# Patient Record
Sex: FEMALE | Race: OTHER
[De-identification: names, ages, dates, MRNs, and addresses within clinical notes are randomized per-mention and may not be internally consistent; named-entity substitution may affect disease eponyms.]

---

## 2017-03-19 ENCOUNTER — HOSPITAL ENCOUNTER (EMERGENCY)
Dept: HOSPITAL 62 - ER | Age: 42
LOS: 1 days | Discharge: HOME | End: 2017-03-20
Payer: SELF-PAY

## 2017-03-19 DIAGNOSIS — L03.115: Primary | ICD-10-CM

## 2017-03-19 DIAGNOSIS — F17.200: ICD-10-CM

## 2017-03-19 DIAGNOSIS — E11.628: ICD-10-CM

## 2017-03-19 PROCEDURE — 80053 COMPREHEN METABOLIC PANEL: CPT

## 2017-03-19 PROCEDURE — 82962 GLUCOSE BLOOD TEST: CPT

## 2017-03-19 PROCEDURE — 99283 EMERGENCY DEPT VISIT LOW MDM: CPT

## 2017-03-19 PROCEDURE — 96365 THER/PROPH/DIAG IV INF INIT: CPT

## 2017-03-19 PROCEDURE — 85025 COMPLETE CBC W/AUTO DIFF WBC: CPT

## 2017-03-19 PROCEDURE — 36415 COLL VENOUS BLD VENIPUNCTURE: CPT

## 2017-03-19 PROCEDURE — 96375 TX/PRO/DX INJ NEW DRUG ADDON: CPT

## 2017-03-19 PROCEDURE — 96376 TX/PRO/DX INJ SAME DRUG ADON: CPT

## 2017-03-20 VITALS — DIASTOLIC BLOOD PRESSURE: 74 MMHG | SYSTOLIC BLOOD PRESSURE: 130 MMHG

## 2017-03-20 LAB
ALBUMIN SERPL-MCNC: 4.4 G/DL (ref 3.5–5)
ALP SERPL-CCNC: 123 U/L (ref 38–126)
ALT SERPL-CCNC: 29 U/L (ref 9–52)
ANION GAP SERPL CALC-SCNC: 15 MMOL/L (ref 5–19)
AST SERPL-CCNC: 16 U/L (ref 14–36)
BASOPHILS NFR BLD MANUAL: 0 % (ref 0–2)
BILIRUB DIRECT SERPL-MCNC: 0 MG/DL (ref 0–0.3)
BILIRUB SERPL-MCNC: 0.6 MG/DL (ref 0.2–1.3)
BUN SERPL-MCNC: 14 MG/DL (ref 7–20)
CALCIUM: 10.6 MG/DL (ref 8.4–10.2)
CHLORIDE SERPL-SCNC: 101 MMOL/L (ref 98–107)
CO2 SERPL-SCNC: 24 MMOL/L (ref 22–30)
CREAT SERPL-MCNC: 0.53 MG/DL (ref 0.52–1.25)
EOSINOPHIL NFR BLD MANUAL: 0 % (ref 0–6)
ERYTHROCYTE [DISTWIDTH] IN BLOOD BY AUTOMATED COUNT: 12.7 % (ref 11.5–14)
GLUCOSE SERPL-MCNC: 362 MG/DL (ref 75–110)
HCT VFR BLD CALC: 51.5 % (ref 36–47)
HGB BLD-MCNC: 17.7 G/DL (ref 12–15.5)
HGB HCT DIFFERENCE: 1.6
MCH RBC QN AUTO: 29.6 PG (ref 27–33.4)
MCHC RBC AUTO-ENTMCNC: 34.3 G/DL (ref 32–36)
MCV RBC AUTO: 86 FL (ref 80–97)
POTASSIUM SERPL-SCNC: 4.2 MMOL/L (ref 3.6–5)
PROT SERPL-MCNC: 8.5 G/DL (ref 6.3–8.2)
RBC # BLD AUTO: 5.97 10^6/UL (ref 3.72–5.28)
RBC MORPH BLD: (no result)
SODIUM SERPL-SCNC: 139.9 MMOL/L (ref 137–145)
TOTAL CELLS COUNTED BLD: 100
VARIANT LYMPHS NFR BLD MANUAL: 27 % (ref 13–45)
WBC # BLD AUTO: 20.2 10^3/UL (ref 4–10.5)

## 2017-03-20 NOTE — ER DOCUMENT REPORT
ED Extremity Problem, Lower





- General


Chief Complaint: High Blood Sugar


Stated Complaint: RIGHT LEG PAIN


Time seen by provider: 02:14


Mode of Arrival: Ambulatory


Information source: Patient


TRAVEL OUTSIDE OF THE [place] IN LAST 30 DAYS: No





- HPI


Patient complains to provider of: Pain


Location: Leg


Occurred: Other - 4 days


Where: Home


Onset/Duration: Gradual


Quality of pain: Achy


Severity: Moderate


Pain Level: 3


Recent injury: No


Exacerbated by: Nothing


Relieved by: Nothing


Notes: 


Patient is a 41-year-old female who presents to the emergency room complaining 

of tender erythematous patch to the lateral portion of her right lower leg that'

s been worsening over the past 4 days, she denies any injury, no history of 

similar symptoms previously, no fever, patient reports that she is a diabetic, 

has not been on her metformin for greater than one year, has not seen a doctor 

in quite some time as well, she is a smoker





- Related Data


Allergies/Adverse Reactions: 


 





No Known Allergies Allergy (Verified 03/20/17 00:02)


 











Past Medical History





- General


Information source: Patient





- Social History


Smoking Status: Current Every Day Smoker


Chew tobacco use (# tins/day): No


Frequency of alcohol use: None


Drug Abuse: None


Family History: Reviewed & Not Pertinent


Endocrine Medical History: Reports: Hx Diabetes Mellitus Type 2


Renal/ Medical History: Denies: Hx Peritoneal Dialysis





- Immunizations


Hx Diphtheria, Pertussis, Tetanus Vaccination: Yes





Review of Systems





- Review of Systems


Constitutional: No symptoms reported


EENT: No symptoms reported


Cardiovascular: No symptoms reported


Respiratory: No symptoms reported


Gastrointestinal: No symptoms reported


Genitourinary: No symptoms reported


Female Genitourinary: No symptoms reported


Musculoskeletal: No symptoms reported


Skin: See HPI


Hematologic/Lymphatic: No symptoms reported


Neurological/Psychological: No symptoms reported


-: Yes All other systems reviewed and negative





Physical Exam





- Vital signs


Vitals: 


 











Temp Pulse Resp BP Pulse Ox


 


 98.9 F   94   16   169/100 H  97 


 


 03/19/17 23:48  03/19/17 23:48  03/19/17 23:48  03/19/17 23:48  03/19/17 23:48











Interpretation: Normal





- General


General appearance: Appears well, Alert





- HEENT


Head: Normocephalic, Atraumatic


Eyes: Normal


Pupils: PERRL





- Respiratory


Respiratory status: No respiratory distress


Chest status: Nontender


Breath sounds: Normal


Chest palpation: Normal





- Cardiovascular


Rhythm: Regular


Heart sounds: Normal auscultation


Murmur: No





- Abdominal


Inspection: Normal


Distension: No distension


Bowel sounds: Normal


Tenderness: Nontender


Organomegaly: No organomegaly





- Back


Back: Normal, Nontender





- Extremities


General upper extremity: Normal inspection, Nontender, Normal color, Normal ROM

, Normal temperature


General lower extremity: Normal ROM, Normal weight bearing.  No: Merary's sign


Calf: Nontender, Other - Patient has a 8 x 4 cm area of erythema with increased 

warmth and tenderness to the lateral portion of the right lower leg, distal 

sensation and motor is intact with 2+ DP pulses





- Neurological


Neuro grossly intact: Yes


Cognition: Normal


Orientation: AAOx4


Theodore Coma Scale Eye Opening: Spontaneous


Theodore Coma Scale Verbal: Oriented


[place] Coma Scale Motor: Obeys Commands


[place] Coma Scale Total: 15


Speech: Normal


Motor strength normal: LUE, RUE, LLE, RLE


Sensory: Normal





- Psychological


Associated symptoms: Normal affect, Normal mood





- Skin


Skin Temperature: Warm


Skin Moisture: Dry


Skin Color: Normal





Course





- Re-evaluation


Re-evalutation: 





03/20/17 03:48


Physical exam findings consistent with cellulitis, patient is noted to have 

markedly leukocytosis, as well as an elevated blood sugar consistent with her 

history of diabetes, states that she's been on metformin in the past, but has 

not been for at least a year, patient was provided with antibiotics, pain 

medication and a prescription for metformin, advised to follow-up with a 

primary care provider or return to the emergency room if symptoms worsen or 

fail to improve over the next 2-3 days, patient acknowledges understanding and 

agreement with this plan





- Vital Signs


Vital signs: 


 











Temp Pulse Resp BP Pulse Ox


 


 98.6 F   89   16   130/74 H  98 


 


 03/20/17 03:32  03/20/17 03:32  03/20/17 03:32  03/20/17 03:32  03/20/17 03:32














- Laboratory


Result Diagrams: 


 03/20/17 00:33





 03/20/17 00:33


Laboratory results interpreted by me: 


 











  03/20/17 03/20/17 03/20/17





  00:23 00:33 00:33


 


WBC   20.2 H 


 


RBC   5.97 H 


 


Hgb   17.7 H 


 


Hct   51.5 H 


 


Abs Neuts (Manual)   13.3 H 


 


Abs Lymphs (Manual)   5.7 H 


 


Glucose    362 H


 


POC Glucose  338 H  


 


Calcium    10.6 H


 


Total Protein    8.5 H














Discharge





- Discharge


Clinical Impression: 


 Cellulitis of right lower extremity





Diabetes


Qualifiers:


 Diabetes mellitus type: type 2 Diabetes mellitus complication status: with 

skin complications Diabetes mellitus complication detail: with other skin 

complication Diabetes mellitus long term insulin use: without long term use 

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 

complications





Condition: Stable


Disposition: HOME, SELF-CARE


Instructions:  Diabetes (OM), Control of Diabetes During Illness (OM), 

Cellulitis (OM)


Additional Instructions: 


Follow up with your primary care provider in one to 2 days.  Return to the 

emergency room immediately if symptoms worsen or any additional concerns.


Prescriptions: 


Clindamycin HCl [Cleocin 150 mg Capsule] 450 mg PO Q6 10 Days


Hydrocodone/Acetaminophen [Hydrocodon-Acetaminophen 5-325] 1 each PO Q6 #20 

tablet


Metformin HCl [Glucophage] 500 mg PO BID #60 tablet


Forms:  Return to Work

## 2017-03-21 ENCOUNTER — HOSPITAL ENCOUNTER (INPATIENT)
Dept: HOSPITAL 62 - ER | Age: 42
LOS: 3 days | Discharge: HOME | DRG: 603 | End: 2017-03-24
Attending: FAMILY MEDICINE | Admitting: FAMILY MEDICINE
Payer: SELF-PAY

## 2017-03-21 DIAGNOSIS — E11.65: ICD-10-CM

## 2017-03-21 DIAGNOSIS — K59.00: ICD-10-CM

## 2017-03-21 DIAGNOSIS — Z83.3: ICD-10-CM

## 2017-03-21 DIAGNOSIS — L03.115: Primary | ICD-10-CM

## 2017-03-21 DIAGNOSIS — F17.210: ICD-10-CM

## 2017-03-21 PROCEDURE — 84703 CHORIONIC GONADOTROPIN ASSAY: CPT

## 2017-03-21 PROCEDURE — 36415 COLL VENOUS BLD VENIPUNCTURE: CPT

## 2017-03-21 PROCEDURE — 87040 BLOOD CULTURE FOR BACTERIA: CPT

## 2017-03-21 PROCEDURE — 96376 TX/PRO/DX INJ SAME DRUG ADON: CPT

## 2017-03-21 PROCEDURE — 99284 EMERGENCY DEPT VISIT MOD MDM: CPT

## 2017-03-21 PROCEDURE — 80048 BASIC METABOLIC PNL TOTAL CA: CPT

## 2017-03-21 PROCEDURE — 82962 GLUCOSE BLOOD TEST: CPT

## 2017-03-21 PROCEDURE — 96367 TX/PROPH/DG ADDL SEQ IV INF: CPT

## 2017-03-21 PROCEDURE — 85025 COMPLETE CBC W/AUTO DIFF WBC: CPT

## 2017-03-21 PROCEDURE — 96375 TX/PRO/DX INJ NEW DRUG ADDON: CPT

## 2017-03-21 PROCEDURE — 96365 THER/PROPH/DIAG IV INF INIT: CPT

## 2017-03-22 LAB
ANION GAP SERPL CALC-SCNC: 16 MMOL/L (ref 5–19)
BASOPHILS # BLD AUTO: 0.1 10^3/UL (ref 0–0.2)
BASOPHILS NFR BLD AUTO: 0.4 % (ref 0–2)
BUN SERPL-MCNC: 14 MG/DL (ref 7–20)
CALCIUM: 9.6 MG/DL (ref 8.4–10.2)
CHLORIDE SERPL-SCNC: 102 MMOL/L (ref 98–107)
CO2 SERPL-SCNC: 23 MMOL/L (ref 22–30)
CREAT SERPL-MCNC: 0.51 MG/DL (ref 0.52–1.25)
EOSINOPHIL # BLD AUTO: 0.3 10^3/UL (ref 0–0.6)
EOSINOPHIL NFR BLD AUTO: 1.5 % (ref 0–6)
ERYTHROCYTE [DISTWIDTH] IN BLOOD BY AUTOMATED COUNT: 12.8 % (ref 11.5–14)
GLUCOSE SERPL-MCNC: 265 MG/DL (ref 75–110)
HCT VFR BLD CALC: 46.2 % (ref 36–47)
HGB BLD-MCNC: 15.8 G/DL (ref 12–15.5)
HGB HCT DIFFERENCE: 1.2
LYMPHOCYTES # BLD AUTO: 4.9 10^3/UL (ref 0.5–4.7)
LYMPHOCYTES NFR BLD AUTO: 26.3 % (ref 13–45)
MCH RBC QN AUTO: 29.5 PG (ref 27–33.4)
MCHC RBC AUTO-ENTMCNC: 34.2 G/DL (ref 32–36)
MCV RBC AUTO: 86 FL (ref 80–97)
MONOCYTES # BLD AUTO: 1.1 10^3/UL (ref 0.1–1.4)
MONOCYTES NFR BLD AUTO: 5.8 % (ref 3–13)
NEUTROPHILS # BLD AUTO: 12.2 10^3/UL (ref 1.7–8.2)
NEUTS SEG NFR BLD AUTO: 66 % (ref 42–78)
POTASSIUM SERPL-SCNC: 4.2 MMOL/L (ref 3.6–5)
RBC # BLD AUTO: 5.37 10^6/UL (ref 3.72–5.28)
SODIUM SERPL-SCNC: 141 MMOL/L (ref 137–145)
WBC # BLD AUTO: 18.5 10^3/UL (ref 4–10.5)

## 2017-03-22 RX ADMIN — METFORMIN HYDROCHLORIDE SCH MG: 500 TABLET, FILM COATED ORAL at 17:55

## 2017-03-22 RX ADMIN — Medication SCH ML: at 21:31

## 2017-03-22 RX ADMIN — INSULIN LISPRO PRN UNIT: 100 INJECTION, SOLUTION INTRAVENOUS; SUBCUTANEOUS at 17:55

## 2017-03-22 RX ADMIN — CLINDAMYCIN PHOSPHATE SCH ML: 18 INJECTION, SOLUTION INTRAVENOUS at 21:31

## 2017-03-22 RX ADMIN — KETOROLAC TROMETHAMINE SCH MG: 30 INJECTION, SOLUTION INTRAMUSCULAR at 21:31

## 2017-03-22 RX ADMIN — INSULIN LISPRO PRN UNIT: 100 INJECTION, SOLUTION INTRAVENOUS; SUBCUTANEOUS at 11:54

## 2017-03-22 RX ADMIN — NICOTINE PRN EACH: 14 PATCH, EXTENDED RELEASE TOPICAL at 10:15

## 2017-03-22 RX ADMIN — INSULIN LISPRO PRN UNIT: 100 INJECTION, SOLUTION INTRAVENOUS; SUBCUTANEOUS at 21:34

## 2017-03-22 RX ADMIN — INSULIN LISPRO PRN UNIT: 100 INJECTION, SOLUTION INTRAVENOUS; SUBCUTANEOUS at 09:40

## 2017-03-22 RX ADMIN — Medication SCH ML: at 14:59

## 2017-03-22 RX ADMIN — PROBIOTIC PRODUCT - TAB SCH MG: TAB at 17:55

## 2017-03-22 NOTE — PDOC H&P
History of Present Illness


Admission Date/PCP: 


  03/22/17 04:10





  


PCP  None


Patient complains of: rt leg infection


History of Present Illness: 


SEGUN CRAWFORD is a 41 year old  female, with underlying type II 

diabetes mellitus, recently started on metformin for same, and mild chronic 

constipation, who presents to the emergency room for the second time in 48-72 

hours for evaluation of above complaint.





4-5 days ago, she noted a small red bump on the lateral aspect of her right 

calf.  This has gradually increased in size with associated burning cramping 

discomfort, which worsens with ambulation or any contact with the area of 

involvement.  Was seen in the emergency room 2 days ago and placed on 

clindamycin; also started on metformin at that time due to history of diabetes.





Despite taking the clindamycin as prescribed, however, the area of redness has 

increased, along with the pain.  Subjective low-grade fever at home.  No 

vomiting.





No prior such episodes.  Denies any trauma to the site.  No history of MRSA.





Patient has been discussed with emergency room physician who evaluated the 

patient. .


 


 


 


Laboratory results are listed in [facility] and are reviewed. 


 


 


Social history/personal habits: .  No children.  Works as a .

  Half-pack of cigarettes per day.  No alcohol or illicit drug use.


 


 


 


Allergies/adverse reactions  NKDA.


 


 


Home medications are reviewed by discussion with patient and are to be  

reconciled by  pharmacy tech in [employer]. Home medications initially 

autopopulated into [facility] may not accurately reflect patient's true 

medications, dosages, and/or frequencies. 


 





REVIEW OF SYSTEMS: 


 


Constitutional:  See history and present illness. 


 


Eyes:   No current vision complaints. 


 


ENT: No swallowing problems or complaints.   No hearing problems or complaints. 


 


Pulmonary: No current complaints. 


 


Cardiovascular: No current complaints, including chest pain.  


 


Gastrointestinal: No current complaints, including nausea or vomiting. 


 


Skin:  See history and present illness. 


 


Hematologic: No unusual easy bruising or bleeding.  


 


Neurologic: No current complaints, including numbness or tingling. 


 


Musculoskeletal: No current complaints, including painful joints.   


 


Psychiatric: No current complaints, including anxiety or depression.   


 


Endocrine: No current complaints, including polyuria. 


 


Genitourinary: No current complaints, including dysuria. 


 


 


PHYSICAL EXAMINATION:


 


5 feet 4 inches tall.  62.6 kg.  BMI 23.7 kg/m.  Temperature 100.0 earlier; 

skin feels normothermic at present.  Blood pressure 116/80.  Pulse 78 and 

regular.  97% saturation on room air.  Respirations are 12 and unlabored.





Thin otherwise well-developed  female, who appears approximately her 

stated age.  Pleasant awake alert and cooperative.  Appears not to feel very 

well, primarily due to pain in her right calf region.  Mildly anxious, but 

without agitation.





 is present at her side; patient approves.





Female emergency room nurse Mohit is present.


 


Skin is warm and dry.  No grossly obvious evidence of rash in areas of skin 

examined.  No subcutaneous nodules palpated.  Examination of the lateral aspect 

of her right calf reveals an area of inflammation, warmth, and tenderness 

involving the great majority of the lateral aspect of the calf.  No crepitus 

fluctuance or expressible discharge.  No specific skin entrance site.


 


ENT: Hearing grossly normal  to normal conversation.  Tongue midline on 

protrusion pink and slightly moist.  


 


Eyes: No scleral icterus.  Pupils equal and reactive to light at 4 mm.  Pink 

conjunctivae. 


 


Neck is supple and nontender to gentle active range of motion and palpation.  

Midline trachea.  No palpable thyroid nodule mass enlargement or tenderness. 


 


Lymphatic: No palpable cervical or clavicular nodes. 


 


Neck and lymphatic exams limited by patient body habitus. 


 


Psychiatric: Reasonable insight into acute and chronic medical issues.  

Oriented to time location and why here. 


 


Lungs: Auscultation reveals clear and equal breath sounds bilaterally.  No use 

of accessory respiratory muscles. 


 


Cardiovascular: Heart regular rate and rhythm, without gallop murmur or rub.  

No carotid or abdominal aortic bruits.  No ankle or pedal edema.   palpable 

dorsalis pedis pulses. 


 


Abdomen: soft,  , slightly distended nontender with positive bowel sounds.  

Unable to adequately evaluate abdomen for masses or organomegaly due to 

distention.


 


Extremities: Feet are warm and dry.  No left calf tenderness to compression.  

No grossly obvious visual evidence of left calf swelling; very mild soft tissue 

swelling in the area of inflammation of the right calf..  Gentle manipulation 

of left lower extremity fails to reveal any obvious evidence of injury or 

instability to knee hip or ankle.  Not attempted on the right due to her 

discomfort.


 


Neurologic: Moves upper extremities grossly normally.  Patellar reflexes 

absent.  Absent Babinski.  Light touch is intact at feet.  Dorsiflexion and 

plantarflexion of feet 5 / 5 and symmetric. 


 


 








Past Medical History


Cardiac Medical History: 


   Denies: Congestive Heart Failure, DVT, Myocardial Infarction, Hyperlipidema, 

Hypertension, Pulmonary Embolism


Pulmonary Medical History: 


   Denies: Asthma, Chronic Obstructive Pulmonary Disease (COPD), Sleep Apnea


EENT Medical History: 


   Denies: Eyes, Ears, Throat


Neurological Medical History: 


   Denies: Hemorrhagic CVA, Ischemic CVA, Seizures


Endocrine Medical History: Reports: Diabetes Mellitus Type 2


   Denies: Hyperthyroidism, Hypothyroidism


Renal/ Medical History: Reports: None


GI Medical History: Reports: Other - Mild chronic constipation


   Denies: Cirrhosis, Hepatitis, Peptic Ulcer Disease


Musculoskeltal Medical History: Reports: None


Skin Medical History: Reports: None


Psychiatric Medical History: Reports: Tobacco Dependency


   Denies: Alcohol Dependency, Depression, General Anxiety Disorder, Substance 

Abuse


Hematology: Reports: None


Infectious Medical History: 


   Denies: Clostridium Difficile, Hepatitis B, Hepatitis C, Methicillin-

Resistant Staph Aureus





Past Surgical History


Past Surgical History: Reports: Other - Excision of benign cervical polyp





Social History


Information Source: Patient, Emergency Med Personnel, [facility]


Lives with: Spouse/Significant other


Smoking Status: Current Every Day Smoker


Frequency of Alcohol Use: None


Drugs: None





- Advance Directive


Resuscitation Status: Full Code


Surrogate healthcare decision maker:: 


Mother





Family History


Family History: Reviewed & Not Pertinent, DM


Parental Family History Reviewed: Yes


Children Family History Reviewed: NA


Sibling(s) Family History Reviewed.: Yes





Medication/Allergy


Home Medications: 








RX: Metformin HCl [Glucophage] 500 mg PO BID #60 tablet 03/20/17 


RX: Clindamycin HCl 300 mg PO Q8H #21 capsule 03/24/17 


Oxycodone HCl/Acetaminophen [Percocet 5-325 mg Tablet] 1 tab PO Q6HP PRN #10 

tablet 03/28/17 


Rivaroxaban [Xarelto] 15 mg PO BID 21 Days 03/28/17 








Allergies/Adverse Reactions: 


 





No Known Allergies Allergy (Verified 03/28/17 13:59)


 











Physical Exam


Vital Signs: 


 











Temp Pulse Resp BP Pulse Ox


 


 100.0 F   98   17   130/86 H  97 


 


 03/21/17 21:59  03/21/17 21:59  03/22/17 07:01  03/22/17 07:01  03/22/17 07:01








 Intake & Output











 03/21/17 03/22/17 03/23/17





 00:59 00:59 00:59


 


Intake Total   1350


 


Balance   1350














Assessment & Plan





- Diagnosis


(1) Tobacco dependency


Is this a current diagnosis for this admission?: YesPlan: 


.  When necessary Nicotine patch.








(2) DVT prophylaxis


Is this a current diagnosis for this admission?: YesPlan: 


SCD, left lower extremity only.  Subcutaneous Lovenox.








(3) Cellulitis of right lower extremity


Is this a current diagnosis for this admission?: YesPlan: 


Intravenous vancomycin and Ancef.  Pharmacy to assist with vancomycin dosing.





Bed rest other than meals and bathroom.  Bed in 20 Trendelenburg.





I have strongly encouraged patient not to get out of bed without notifying 

staff , to avoid a fall with injury.


 


Impression and plans were discussed with patient, and , both of whom 

concur.


 


Time spent in evaluation and management of patient: 58 minutes.








(4) Diabetes mellitus type 2 in nonobese


Is this a current diagnosis for this admission?: YesPlan: 


Diabetic cardiac diet.  Accu-Cheks with appropriate sliding scale 

coverage.Resume home medications as appropriate once these have been determined 

and reviewed.  











- Inpatient Certification


Based on my medical assessment, after consideration of the patient's 

comorbidities, presenting symptoms, or acuity I expect that the services needed 

warrant INPATIENT care.: Yes


I certify that my determination is in accordance with my understanding of 

Medicare's requirements for reasonable and necessary INPATIENT services [42 CFR 

412.3e].: Yes


Medical Necessity: Failure to Improve With Outpatient Therapy, Need Close 

Monitoring Due to Risk of Patient Decompensation, Need for IV Antibiotics, Risk 

of Complication if Not Cared For in Hospital


Post Hospital Care: D/C or Transfer Summary

## 2017-03-22 NOTE — PDOC PROGRESS REPORT
Subjective


Progress Note for:: 03/22/17


Subjective:: 


The patient was seen earlier today on rounds.  The patient admits to ongoing 

calf pain.  The patient is tearful at times stating the pain is excruciating.  

The patient was noted to be texting during the interview.  The patient denies 

any nausea, vomiting, diarrhea, shortness of breath, dizziness, chest pain, 

heart palpitations, fevers, or chills.  The patient has remained afebrile.  

Blood pressures have been in a good range.  When prompted the patient voices no 

other concerns at this time.  





Review of systems: The rest of the review of systems is negative.





Physical Exam


Vital Signs: 


 











Temp Pulse Resp BP Pulse Ox


 


 98 F   75   20   169/94 H  100 


 


 03/22/17 14:20  03/22/17 14:20  03/22/17 14:20  03/22/17 14:20  03/22/17 14:20








 Intake & Output











 03/20/17 03/21/17 03/22/17





 23:59 23:59 23:59


 


Intake Total   50


 


Balance   50


 


Weight   64.5 kg











General appearance: PRESENT: no acute distress, well-developed, well-nourished


Head exam: PRESENT: atraumatic, normocephalic


Eye exam: PRESENT: conjunctiva pink, EOMI, PERRLA.  ABSENT: scleral icterus


Ear exam: PRESENT: normal external ear exam


Mouth exam: PRESENT: moist, tongue midline


Neck exam: ABSENT: carotid bruit, JVD, lymphadenopathy, thyromegaly


Respiratory exam: PRESENT: clear to auscultation patrica.  ABSENT: rales, rhonchi, 

wheezes


Cardiovascular exam: PRESENT: RRR.  ABSENT: diastolic murmur, rubs, systolic 

murmur


Pulses: PRESENT: normal dorsalis pedis pul


Vascular exam: PRESENT: normal capillary refill


GI/Abdominal exam: PRESENT: normal bowel sounds, soft.  ABSENT: distended, 

guarding, mass, organolmegaly, rebound, tenderness


Rectal exam: PRESENT: deferred


Extremities exam: PRESENT: calf tenderness - Redness and cellulitis appears to 

have receded from previous markings..  ABSENT: clubbing, pedal edema


Neurological exam: PRESENT: alert, awake, oriented to person, oriented to place

, oriented to time, oriented to situation, CN II-XII grossly intact.  ABSENT: 

motor sensory deficit


Psychiatric exam: PRESENT: appropriate affect, normal mood.  ABSENT: homicidal 

ideation, suicidal ideation


Skin exam: PRESENT: dry, intact, warm.  ABSENT: cyanosis, rash





Results


Laboratory Results: 


 Labs- Last Values











WBC  18.5 10^3/uL (4.0-10.5)  H  03/22/17  00:43    


 


RBC  5.37 10^6/uL (3.72-5.28)  H  03/22/17  00:43    


 


Hgb  15.8 g/dL (12.0-15.5)  H  03/22/17  00:43    


 


Hct  46.2 % (36.0-47.0)   03/22/17  00:43    


 


MCV  86 fl (80-97)   03/22/17  00:43    


 


MCH  29.5 pg (27.0-33.4)   03/22/17  00:43    


 


MCHC  34.2 g/dL (32.0-36.0)   03/22/17  00:43    


 


RDW  12.8 % (11.5-14.0)   03/22/17  00:43    


 


Plt Count  300 10^3/uL (150-450)   03/22/17  00:43    


 


Seg Neutrophils %  66.0 % (42-78)   03/22/17  00:43    


 


Lymphocytes %  26.3 % (13-45)   03/22/17  00:43    


 


Monocytes %  5.8 % (3-13)   03/22/17  00:43    


 


Eosinophils %  1.5 % (0-6)   03/22/17  00:43    


 


Basophils %  0.4 % (0-2)   03/22/17  00:43    


 


Absolute Neutrophils  12.2 10^3/uL (1.7-8.2)  H  03/22/17  00:43    


 


Absolute Lymphocytes  4.9 10^3/uL (0.5-4.7)  H  03/22/17  00:43    


 


Absolute Monocytes  1.1 10^3/uL (0.1-1.4)   03/22/17  00:43    


 


Absolute Eosinophils  0.3 10^3/uL (0.0-0.6)   03/22/17  00:43    


 


Absolute Basophils  0.1 10^3/uL (0.0-0.2)   03/22/17  00:43    


 


Sodium  141.0 mmol/L (137-145)   03/22/17  00:43    


 


Potassium  4.2 mmol/L (3.6-5.0)   03/22/17  00:43    


 


Chloride  102 mmol/L ()   03/22/17  00:43    


 


Carbon Dioxide  23 mmol/L (22-30)   03/22/17  00:43    


 


Anion Gap  16  (5-19)   03/22/17  00:43    


 


BUN  14 mg/dL (7-20)   03/22/17  00:43    


 


Creatinine  0.51 mg/dL (0.52-1.25)  L  03/22/17  00:43    


 


Est GFR ( Amer)  > 60  (>60)   03/22/17  00:43    


 


Est GFR (Non-Af Amer)  > 60  (>60)   03/22/17  00:43    


 


Glucose  265 mg/dL ()  H  03/22/17  00:43    


 


POC Glucose  180 mg/dL ()  H  03/22/17  16:33    


 


Calcium  9.6 mg/dL (8.4-10.2)   03/22/17  00:43    


 


Serum HCG, Qual  NEGATIVE  (NEGATIVE)   03/22/17  00:43    














Assessment & Plan





- Diagnosis


(1) Cellulitis of right lower extremity


Is this a current diagnosis for this admission?: YesPlan: 





Will transition antibiotic coverage to clindamycin will add probiotic and 

follow.  The patient's creatinine is in a normal range will scheduled Toradol 

for now to aid with inflammation.








(2) Diabetes mellitus type 2 in nonobese


Is this a current diagnosis for this admission?: YesPlan: 


My home meds as well as signs go coverage








(3) Tobacco dependency


Is this a current diagnosis for this admission?: YesPlan: 


Spent 3 minutes discussing smoking cessation education. The patient declines 

any pharmacological intervention at this time however will add a PRN nicotine 

patch.








(4) DVT prophylaxis


Is this a current diagnosis for this admission?: Yes








- Time


Time Spent with patient: 35 or more minutes


Medications reviewed and adjusted accordingly: Yes


Anticipated discharge: Home


Within: within 24 hours, within 48 hours


Disposition: 


The patient is a full code.  Pending patient's symptomatology and diagnostic 

findings will reevaluate in the a.m.

## 2017-03-22 NOTE — ER DOCUMENT REPORT
ED General





- General


Chief Complaint: Skin Problem


Stated Complaint: POSSIBLE CELLULITIS


Notes: 


Patient is a 41-year-old female presents with complaint of a virus in her right 

leg.  She was here 2 days ago and placed on clindamycin.  She was also placed 

on the metformin due to a history of diabetes.  Today the redness has spread 

and the pain has increased in her leg.  She has been taking medications as 

prescribed.  Low-grade fevers at home.  No vomiting.  Patient has never had a 

problem with cellulitis in the past.


TRAVEL OUTSIDE OF THE [place] IN LAST 30 DAYS: No





- Related Data


Allergies/Adverse Reactions: 


 





No Known Allergies Allergy (Verified 03/20/17 00:02)


 











Past Medical History





- Social History


Smoking Status: Current Every Day Smoker


Chew tobacco use (# tins/day): No


Frequency of alcohol use: None


Drug Abuse: None


Family History: Reviewed & Not Pertinent


Patient has suicidal ideation: No


Patient has homicidal ideation: No


Endocrine Medical History: Reports: Hx Diabetes Mellitus Type 2


Renal/ Medical History: Denies: Hx Peritoneal Dialysis





- Immunizations


Hx Diphtheria, Pertussis, Tetanus Vaccination: Yes





Review of Systems





- Review of Systems


Notes: 


My Normal Review Basic





REVIEW OF SYSTEMS:


CONSTITUTIONAL :  Denies fever,  chills, or sweats.  Denies recent illness.


EENT:   Denies eye, ear, throat, or mouth pain or symptoms.  Denies nasal or 

sinus congestion.


RESPIRATORY:  Denies cough, cold, or chest congestion.  Denies shortness of 

breath, difficulty breathing, or wheezing.


GASTROINTESTINAL:  Denies abdominal pain.  Denies nausea, vomiting, or 

diarrhea.  Denies constipation.  Last BM: 


GENITOURINARY:  Denies difficulty urinating, painful urination, burning, 

frequency, or blood in urine.


MUSCULOSKELETAL:  Denies neck or back pain or joint pain or swelling.


SKIN: Cellulitis right lower extremity.


NEUROLOGICAL:  Denies altered mental status or loss of consciousness.  Denies 

headache.  Denies weakness or paralysis or loss of use of either side.  Denies 

problems with gait or speech.  Denies sensory or motor loss.


ALL OTHER SYSTEMS REVIEWED AND NEGATIVE.





Physical Exam





- Vital signs


Vitals: 


 











Temp Pulse Resp BP Pulse Ox


 


 100.0 F   98   16   156/89 H  97 


 


 03/21/17 21:59  03/21/17 21:59  03/21/17 21:59  03/21/17 21:59  03/21/17 21:59














- Notes


Notes: 


General Appearance: Well nourished, alert, cooperative, no acute distress, 

moderate obvious discomfort.


Vitals: reviewed, See vital signs table.


Head: no swelling or tenderness to the head


Eyes: PERRL, EOMI, Conjuctiva clear


Mouth: No decreasd moisture


Lungs: No wheezing, No rales, No rhonci, No accessory muscle use, good air 

exchange bilaterally.


Heart: Normal rate, Regular rythm, No murmur, no rub


Extremities: strength 5/5 in all extremities, good pulses in all extremities, 

area cellulitis and redness over the lateral aspect of the right lower 

extremity.  Area has extended beyond the demarcated margins were marked 2 days 

ago.  There is no fluctuance.  Area is tender to palpation.


Skin: warm, dry, appropriate color, no rash


Neuro: speech clear, oriented x 3, normal affect, responds appropriately to 

questions.





Course





- Vital Signs


Vital signs: 


 











Temp Pulse Resp BP Pulse Ox


 


 100.0 F   98   22 H  133/81 H  99 


 


 03/21/17 21:59  03/21/17 21:59  03/22/17 02:01  03/22/17 02:01  03/22/17 02:01














- Laboratory


Result Diagrams: 


 03/22/17 00:43





 03/22/17 00:43


Laboratory results interpreted by me: 


 











  03/22/17 03/22/17





  00:43 00:43


 


WBC  18.5 H 


 


RBC  5.37 H 


 


Hgb  15.8 H 


 


Absolute Neutrophils  12.2 H 


 


Absolute Lymphocytes  4.9 H 


 


Creatinine   0.51 L


 


Glucose   265 H














- Transfer of Care


Notes: 





03/22/17 03:45


Due to patient's fever, leukocytosis, and worsening cellulitis despite 

outpatient therapy if felt appropriate to admit her for further treatment of 

her cellulitis.  I've given her Vancomycin and Rocephin.  I did speak with the 

hospitalist who agrees in that the patient.





Discharge





- Discharge


Clinical Impression: 


 Cellulitis of right lower extremity, Hyperglycemia





Condition: Stable


Disposition: ADMITTED AS OBSERVATION


Admitting Provider: Hospitalist


Unit Admitted: Telemetry

## 2017-03-23 LAB
BASOPHILS # BLD AUTO: 0.1 10^3/UL (ref 0–0.2)
BASOPHILS NFR BLD AUTO: 0.7 % (ref 0–2)
EOSINOPHIL # BLD AUTO: 0.2 10^3/UL (ref 0–0.6)
EOSINOPHIL NFR BLD AUTO: 1.2 % (ref 0–6)
ERYTHROCYTE [DISTWIDTH] IN BLOOD BY AUTOMATED COUNT: 12.5 % (ref 11.5–14)
HCT VFR BLD CALC: 39.9 % (ref 36–47)
HGB BLD-MCNC: 13.4 G/DL (ref 12–15.5)
HGB HCT DIFFERENCE: 0.3
LYMPHOCYTES # BLD AUTO: 4 10^3/UL (ref 0.5–4.7)
LYMPHOCYTES NFR BLD AUTO: 26.3 % (ref 13–45)
MCH RBC QN AUTO: 28.7 PG (ref 27–33.4)
MCHC RBC AUTO-ENTMCNC: 33.7 G/DL (ref 32–36)
MCV RBC AUTO: 85 FL (ref 80–97)
MONOCYTES # BLD AUTO: 0.9 10^3/UL (ref 0.1–1.4)
MONOCYTES NFR BLD AUTO: 6.2 % (ref 3–13)
NEUTROPHILS # BLD AUTO: 10 10^3/UL (ref 1.7–8.2)
NEUTS SEG NFR BLD AUTO: 65.6 % (ref 42–78)
RBC # BLD AUTO: 4.69 10^6/UL (ref 3.72–5.28)
WBC # BLD AUTO: 15.3 10^3/UL (ref 4–10.5)

## 2017-03-23 RX ADMIN — METFORMIN HYDROCHLORIDE SCH MG: 500 TABLET, FILM COATED ORAL at 09:52

## 2017-03-23 RX ADMIN — PROBIOTIC PRODUCT - TAB SCH MG: TAB at 17:51

## 2017-03-23 RX ADMIN — Medication SCH ML: at 13:58

## 2017-03-23 RX ADMIN — CLINDAMYCIN PHOSPHATE SCH ML: 18 INJECTION, SOLUTION INTRAVENOUS at 13:57

## 2017-03-23 RX ADMIN — KETOROLAC TROMETHAMINE SCH MG: 30 INJECTION, SOLUTION INTRAMUSCULAR at 08:54

## 2017-03-23 RX ADMIN — PROBIOTIC PRODUCT - TAB SCH MG: TAB at 09:52

## 2017-03-23 RX ADMIN — ENOXAPARIN SODIUM SCH MG: 40 INJECTION SUBCUTANEOUS at 08:00

## 2017-03-23 RX ADMIN — CLINDAMYCIN PHOSPHATE SCH ML: 18 INJECTION, SOLUTION INTRAVENOUS at 21:25

## 2017-03-23 RX ADMIN — KETOROLAC TROMETHAMINE SCH MG: 30 INJECTION, SOLUTION INTRAMUSCULAR at 14:27

## 2017-03-23 RX ADMIN — NICOTINE PRN EACH: 14 PATCH, EXTENDED RELEASE TOPICAL at 11:58

## 2017-03-23 RX ADMIN — KETOROLAC TROMETHAMINE SCH MG: 30 INJECTION, SOLUTION INTRAMUSCULAR at 21:25

## 2017-03-23 RX ADMIN — Medication SCH ML: at 06:38

## 2017-03-23 RX ADMIN — METFORMIN HYDROCHLORIDE SCH MG: 500 TABLET, FILM COATED ORAL at 17:51

## 2017-03-23 RX ADMIN — Medication SCH ML: at 21:26

## 2017-03-23 RX ADMIN — INSULIN LISPRO PRN UNIT: 100 INJECTION, SOLUTION INTRAVENOUS; SUBCUTANEOUS at 11:54

## 2017-03-23 RX ADMIN — KETOROLAC TROMETHAMINE SCH MG: 30 INJECTION, SOLUTION INTRAMUSCULAR at 03:58

## 2017-03-23 RX ADMIN — CLINDAMYCIN PHOSPHATE SCH ML: 18 INJECTION, SOLUTION INTRAVENOUS at 06:38

## 2017-03-23 NOTE — PDOC PROGRESS REPORT
Subjective


Progress Note for:: 03/23/17


Subjective:: 


The patient was seen earlier today on rounds.  The patient admits to ongoing 

calf pain.  The patient is tearful at times but it overall is much improved.   

The patient denies any nausea, vomiting, diarrhea, shortness of breath, 

dizziness, chest pain, heart palpitations, fevers, or chills.  The patient has 

remained afebrile.  Blood pressures have been in a good range.  When prompted 

the patient voices no other concerns at this time.  





Review of systems: The rest of the review of systems is negative.





Physical Exam


Vital Signs: 


 











Temp Pulse Resp BP Pulse Ox


 


 98.8 F   76   16   128/85 H  98 


 


 03/23/17 12:49  03/23/17 12:49  03/23/17 12:49  03/23/17 12:49  03/23/17 12:49








 Intake & Output











 03/21/17 03/22/17 03/23/17





 23:59 23:59 23:59


 


Intake Total  130 160


 


Balance  130 160


 


Weight  64.5 kg 








General appearance: PRESENT: no acute distress, well-developed, well-nourished


Head exam: PRESENT: atraumatic, normocephalic


Eye exam: PRESENT: conjunctiva pink, EOMI, PERRLA.  ABSENT: scleral icterus


Ear exam: PRESENT: normal external ear exam


Mouth exam: PRESENT: moist, tongue midline


Neck exam: ABSENT: carotid bruit, JVD, lymphadenopathy, thyromegaly


Respiratory exam: PRESENT: clear to auscultation patrica.  ABSENT: rales, rhonchi, 

wheezes


Cardiovascular exam: PRESENT: RRR.  ABSENT: diastolic murmur, rubs, systolic 

murmur


Pulses: PRESENT: normal dorsalis pedis pul


Vascular exam: PRESENT: normal capillary refill


GI/Abdominal exam: PRESENT: normal bowel sounds, soft.  ABSENT: distended, 

guarding, mass, organolmegaly, rebound, tenderness


Rectal exam: PRESENT: deferred


Extremities exam: PRESENT: calf tenderness - Redness and cellulitis appears to 

have receded from previous markings and from yesterday.  ABSENT: clubbing, 

pedal edema


Neurological exam: PRESENT: alert, awake, oriented to person, oriented to place

, oriented to time, oriented to situation, CN II-XII grossly intact.  ABSENT: 

motor sensory deficit


Psychiatric exam: PRESENT: appropriate affect, normal mood.  ABSENT: homicidal 

ideation, suicidal ideation


Skin exam: PRESENT: dry, intact, warm.  ABSENT: cyanosis, rash





Results


Laboratory Results: 


 





 03/23/17 06:07 





 











  03/23/17 03/23/17





  04:17 06:07


 


WBC  Cancelled  15.3 H


 


RBC  Cancelled  4.69


 


Hgb  Cancelled  13.4  D


 


Hct  Cancelled  39.9


 


MCV  Cancelled  85


 


MCH  Cancelled  28.7


 


MCHC  Cancelled  33.7


 


RDW  Cancelled  12.5


 


Plt Count  Cancelled  265


 


Seg Neutrophils %  Cancelled  65.6


 


Lymphocytes %  Cancelled  26.3


 


Monocytes %  Cancelled  6.2


 


Eosinophils %  Cancelled  1.2


 


Basophils %  Cancelled  0.7


 


Absolute Neutrophils  Cancelled  10.0 H


 


Absolute Lymphocytes  Cancelled  4.0


 


Absolute Monocytes  Cancelled  0.9


 


Absolute Eosinophils  Cancelled  0.2


 


Absolute Basophils  Cancelled  0.1














Assessment & Plan





- Diagnosis


(1) Cellulitis of right lower extremity


Is this a current diagnosis for this admission?: YesPlan: 








Will continue antibiotic coverage to clindamycin will add probiotic and follow.

  The patient's creatinine is in a normal range continue scheduled Toradol for 

now to aid with inflammation.








(2) Diabetes mellitus type 2 in nonobese


Is this a current diagnosis for this admission?: YesPlan: 





Home medications and sliding scale coverage








(3) Tobacco dependency


Is this a current diagnosis for this admission?: YesPlan: 





PRN nicotine patch.








(4) DVT prophylaxis


Is this a current diagnosis for this admission?: Yes








- Time


Time Spent with patient: 25-34 minutes


Medications reviewed and adjusted accordingly: Yes


Anticipated discharge: Home


Within: within 24 hours

## 2017-03-24 VITALS — DIASTOLIC BLOOD PRESSURE: 85 MMHG | SYSTOLIC BLOOD PRESSURE: 140 MMHG

## 2017-03-24 RX ADMIN — Medication SCH ML: at 05:43

## 2017-03-24 RX ADMIN — KETOROLAC TROMETHAMINE SCH MG: 30 INJECTION, SOLUTION INTRAMUSCULAR at 02:18

## 2017-03-24 RX ADMIN — KETOROLAC TROMETHAMINE SCH MG: 30 INJECTION, SOLUTION INTRAMUSCULAR at 08:07

## 2017-03-24 RX ADMIN — METFORMIN HYDROCHLORIDE SCH MG: 500 TABLET, FILM COATED ORAL at 09:34

## 2017-03-24 RX ADMIN — PROBIOTIC PRODUCT - TAB SCH MG: TAB at 09:34

## 2017-03-24 RX ADMIN — INSULIN LISPRO PRN UNIT: 100 INJECTION, SOLUTION INTRAVENOUS; SUBCUTANEOUS at 05:59

## 2017-03-24 RX ADMIN — ENOXAPARIN SODIUM SCH MG: 40 INJECTION SUBCUTANEOUS at 07:56

## 2017-03-24 RX ADMIN — INSULIN LISPRO PRN UNIT: 100 INJECTION, SOLUTION INTRAVENOUS; SUBCUTANEOUS at 13:34

## 2017-03-24 RX ADMIN — CLINDAMYCIN PHOSPHATE SCH ML: 18 INJECTION, SOLUTION INTRAVENOUS at 05:40

## 2017-03-24 RX ADMIN — Medication SCH ML: at 13:37

## 2017-03-24 RX ADMIN — CLINDAMYCIN PHOSPHATE SCH ML: 18 INJECTION, SOLUTION INTRAVENOUS at 13:37

## 2017-03-24 NOTE — PDOC DISCHARGE SUMMARY
General





- Admit/Disc Date/PCP


Admission Date/Primary Care Provider: 


  03/22/17 08:04





  


[facility]


Discharge Date: 03/24/17





- Discharge Diagnosis


(1) Cellulitis of right lower extremity


Is this a current diagnosis for this admission?: Yes





(2) Diabetes mellitus type 2 in nonobese


Is this a current diagnosis for this admission?: Yes





(3) Tobacco dependency


Is this a current diagnosis for this admission?: Yes





(4) DVT prophylaxis


Is this a current diagnosis for this admission?: Yes








- Additional Information


Resuscitation Status: Full Code


Discharge Diet: As Tolerated, Regular


Discharge Activity: Balance Activity w/Rest, Keep Legs Elevated, Slowly 

Increase Activity


Home Medications: 








Metformin HCl [Glucophage] 500 mg PO BID #60 tablet 03/20/17 


Clindamycin HCl 300 mg PO Q8H #21 capsule 03/24/17 











History of Present Illness


Patient complains of: Right leg


History of Present Illness: 


SEGNU CRAWFORD is a 41 year old female with underlying type II diabetes mellitus

, recently started on metformin for same, and mild chronic constipation, who 

presents to the emergency room for the second time in 48-72 hours for 

evaluation of above complaint.





4-5 days ago, she noted a small red bump on the lateral aspect of her right 

calf.  This has gradually increased in size with associated burning cramping 

discomfort, which worsens with ambulation or any contact with the area of 

involvement.  Was seen in the emergency room 2 days ago and placed on 

clindamycin; also started on metformin at that time due to history of diabetes.





Despite taking the clindamycin as prescribed, however, the area of redness has 

increased, along with the pain.  Subjective low-grade fever at home.  No 

vomiting.





No prior such episodes.  Denies any trauma to the site.  No history of MRSA.








Hospital Course


Hospital Course: 


The patient was admitted to continuous telemetry unit.  Blood cultures were 

obtained.  Patient was started on broad-spectrum IV antibiotic coverage.  Blood 

cultures revealed no growth.  The patient was started on scheduled Toradol for 

inflammation.  The patient had drastic improvement of symptoms.  No further 

fever, white count has improved, and clinical assessment has improved.  Patient 

is ready for discharge.  The patient has no known allergies.  The patient does 

have a follow-up appointment with [facility].





Physical Exam


Vital Signs: 


 











Temp Pulse Resp BP Pulse Ox


 


 98.3 F   65   16   140/85 H  97 


 


 03/24/17 13:17  03/24/17 13:17  03/24/17 13:17  03/24/17 13:17  03/24/17 13:17








 Intake & Output











 03/22/17 03/23/17 03/24/17





 23:59 23:59 23:59


 


Intake Total 130 360 400


 


Balance 130 360 400


 


Weight 64.5 kg  66.3 kg








General appearance: PRESENT: no acute distress, well-developed, well-nourished


Head exam: PRESENT: atraumatic, normocephalic


Eye exam: PRESENT: conjunctiva pink, EOMI, PERRLA.  ABSENT: scleral icterus


Ear exam: PRESENT: normal external ear exam


Mouth exam: PRESENT: moist, tongue midline


Neck exam: ABSENT: carotid bruit, JVD, lymphadenopathy, thyromegaly


Respiratory exam: PRESENT: clear to auscultation patrica.  ABSENT: rales, rhonchi, 

wheezes


Cardiovascular exam: PRESENT: RRR.  ABSENT: diastolic murmur, rubs, systolic 

murmur


Pulses: PRESENT: normal dorsalis pedis pul


Vascular exam: PRESENT: normal capillary refill


GI/Abdominal exam: PRESENT: normal bowel sounds, soft.  ABSENT: distended, 

guarding, mass, organolmegaly, rebound, tenderness


Rectal exam: PRESENT: deferred


Extremities exam: PRESENT: calf tenderness -nearly resolved.  Redness and 

cellulitis appears to have receded from previous markings and from yesterday.  

ABSENT: clubbing, pedal edema


Neurological exam: PRESENT: alert, awake, oriented to person, oriented to place

, oriented to time, oriented to situation, CN II-XII grossly intact.  ABSENT: 

motor sensory deficit


Psychiatric exam: PRESENT: appropriate affect, normal mood.  ABSENT: homicidal 

ideation, suicidal ideation


Skin exam: PRESENT: dry, intact, warm.  ABSENT: cyanosis, rash








Results


Laboratory Results: 


 Labs- Last Values











WBC  15.3 10^3/uL (4.0-10.5)  H  03/23/17  06:07    


 


RBC  4.69 10^6/uL (3.72-5.28)   03/23/17  06:07    


 


Hgb  13.4 g/dL (12.0-15.5)  D 03/23/17  06:07    


 


Hct  39.9 % (36.0-47.0)   03/23/17  06:07    


 


MCV  85 fl (80-97)   03/23/17  06:07    


 


MCH  28.7 pg (27.0-33.4)   03/23/17  06:07    


 


MCHC  33.7 g/dL (32.0-36.0)   03/23/17  06:07    


 


RDW  12.5 % (11.5-14.0)   03/23/17  06:07    


 


Plt Count  265 10^3/uL (150-450)   03/23/17  06:07    


 


Seg Neutrophils %  65.6 % (42-78)   03/23/17  06:07    


 


Lymphocytes %  26.3 % (13-45)   03/23/17  06:07    


 


Monocytes %  6.2 % (3-13)   03/23/17  06:07    


 


Eosinophils %  1.2 % (0-6)   03/23/17  06:07    


 


Basophils %  0.7 % (0-2)   03/23/17  06:07    


 


Absolute Neutrophils  10.0 10^3/uL (1.7-8.2)  H  03/23/17  06:07    


 


Absolute Lymphocytes  4.0 10^3/uL (0.5-4.7)   03/23/17  06:07    


 


Absolute Monocytes  0.9 10^3/uL (0.1-1.4)   03/23/17  06:07    


 


Absolute Eosinophils  0.2 10^3/uL (0.0-0.6)   03/23/17  06:07    


 


Absolute Basophils  0.1 10^3/uL (0.0-0.2)   03/23/17  06:07    


 


Platelet Estimate  Cancelled   03/23/17  04:17    


 


Sodium  141.0 mmol/L (137-145)   03/22/17  00:43    


 


Potassium  4.2 mmol/L (3.6-5.0)   03/22/17  00:43    


 


Chloride  102 mmol/L ()   03/22/17  00:43    


 


Carbon Dioxide  23 mmol/L (22-30)   03/22/17  00:43    


 


Anion Gap  16  (5-19)   03/22/17  00:43    


 


BUN  14 mg/dL (7-20)   03/22/17  00:43    


 


Creatinine  0.51 mg/dL (0.52-1.25)  L  03/22/17  00:43    


 


Est GFR ( Amer)  > 60  (>60)   03/22/17  00:43    


 


Est GFR (Non-Af Amer)  > 60  (>60)   03/22/17  00:43    


 


Glucose  265 mg/dL ()  H  03/22/17  00:43    


 


POC Glucose  190 mg/dL ()  H  03/24/17  11:30    


 


Calcium  9.6 mg/dL (8.4-10.2)   03/22/17  00:43    


 


Serum HCG, Qual  NEGATIVE  (NEGATIVE)   03/22/17  00:43    


 


Slides for Path Review  Cancelled   03/23/17  04:17    








 











 03/22/17 10:36 Blood Culture - Preliminary





 Blood      NO GROWTH AFTER 48 HOURS


 


 03/22/17 00:43 Blood Culture - Preliminary





 Blood      NO GROWTH AFTER 48 HOURS














Qualifiers


**PATEINT BEING DISCHARGED WITH ANY OF THE FOLLOWING DIAGNOSIS?: No





Plan


Time Spent: Less than 30 Minutes

## 2017-03-28 ENCOUNTER — HOSPITAL ENCOUNTER (OUTPATIENT)
Dept: HOSPITAL 62 - SP | Age: 42
End: 2017-03-28
Payer: SELF-PAY

## 2017-03-28 ENCOUNTER — HOSPITAL ENCOUNTER (EMERGENCY)
Dept: HOSPITAL 62 - ER | Age: 42
Discharge: HOME | End: 2017-03-28
Payer: SELF-PAY

## 2017-03-28 DIAGNOSIS — M79.89: ICD-10-CM

## 2017-03-28 DIAGNOSIS — I82.491: Primary | ICD-10-CM

## 2017-03-28 DIAGNOSIS — M79.89: Primary | ICD-10-CM

## 2017-03-28 DIAGNOSIS — M79.604: ICD-10-CM

## 2017-03-28 DIAGNOSIS — L03.115: ICD-10-CM

## 2017-03-28 DIAGNOSIS — I82.491: ICD-10-CM

## 2017-03-28 DIAGNOSIS — F17.200: ICD-10-CM

## 2017-03-28 LAB
ALBUMIN SERPL-MCNC: 4.4 G/DL (ref 3.5–5)
ALP SERPL-CCNC: 65 U/L (ref 38–126)
ALT SERPL-CCNC: 35 U/L (ref 9–52)
ANION GAP SERPL CALC-SCNC: 14 MMOL/L (ref 5–19)
APTT BLD: 26.3 SEC (ref 23.5–35.8)
AST SERPL-CCNC: 14 U/L (ref 14–36)
BASOPHILS # BLD AUTO: 0.1 10^3/UL (ref 0–0.2)
BASOPHILS NFR BLD AUTO: 0.9 % (ref 0–2)
BILIRUB DIRECT SERPL-MCNC: 0.1 MG/DL (ref 0–0.4)
BILIRUB SERPL-MCNC: 0.5 MG/DL (ref 0.2–1.3)
BUN SERPL-MCNC: 13 MG/DL (ref 7–20)
CALCIUM: 10.4 MG/DL (ref 8.4–10.2)
CHLORIDE SERPL-SCNC: 104 MMOL/L (ref 98–107)
CO2 SERPL-SCNC: 25 MMOL/L (ref 22–30)
CREAT SERPL-MCNC: 0.46 MG/DL (ref 0.52–1.25)
EOSINOPHIL # BLD AUTO: 0.2 10^3/UL (ref 0–0.6)
EOSINOPHIL NFR BLD AUTO: 1.7 % (ref 0–6)
ERYTHROCYTE [DISTWIDTH] IN BLOOD BY AUTOMATED COUNT: 13.1 % (ref 11.5–14)
GLUCOSE SERPL-MCNC: 115 MG/DL (ref 75–110)
HCT VFR BLD CALC: 45.3 % (ref 36–47)
HGB BLD-MCNC: 15.7 G/DL (ref 12–15.5)
HGB HCT DIFFERENCE: 1.8
LYMPHOCYTES # BLD AUTO: 5.6 10^3/UL (ref 0.5–4.7)
LYMPHOCYTES NFR BLD AUTO: 39.3 % (ref 13–45)
MCH RBC QN AUTO: 29.4 PG (ref 27–33.4)
MCHC RBC AUTO-ENTMCNC: 34.6 G/DL (ref 32–36)
MCV RBC AUTO: 85 FL (ref 80–97)
MONOCYTES # BLD AUTO: 0.9 10^3/UL (ref 0.1–1.4)
MONOCYTES NFR BLD AUTO: 6.4 % (ref 3–13)
NEUTROPHILS # BLD AUTO: 7.4 10^3/UL (ref 1.7–8.2)
NEUTS SEG NFR BLD AUTO: 51.7 % (ref 42–78)
POTASSIUM SERPL-SCNC: 4 MMOL/L (ref 3.6–5)
PROT SERPL-MCNC: 8 G/DL (ref 6.3–8.2)
PROTHROMBIN TIME: 12.8 SEC (ref 11.4–15.4)
RBC # BLD AUTO: 5.34 10^6/UL (ref 3.72–5.28)
SODIUM SERPL-SCNC: 142.9 MMOL/L (ref 137–145)
WBC # BLD AUTO: 14.3 10^3/UL (ref 4–10.5)

## 2017-03-28 PROCEDURE — 85610 PROTHROMBIN TIME: CPT

## 2017-03-28 PROCEDURE — 93971 EXTREMITY STUDY: CPT

## 2017-03-28 PROCEDURE — 80053 COMPREHEN METABOLIC PANEL: CPT

## 2017-03-28 PROCEDURE — 99283 EMERGENCY DEPT VISIT LOW MDM: CPT

## 2017-03-28 PROCEDURE — 84702 CHORIONIC GONADOTROPIN TEST: CPT

## 2017-03-28 PROCEDURE — 85730 THROMBOPLASTIN TIME PARTIAL: CPT

## 2017-03-28 PROCEDURE — 36415 COLL VENOUS BLD VENIPUNCTURE: CPT

## 2017-03-28 PROCEDURE — 85025 COMPLETE CBC W/AUTO DIFF WBC: CPT

## 2017-03-28 NOTE — XCELERA REPORT
[address] [address]

                             Tel: [phone]

                             Fax: [phone]



                     Lower Extremity Venous Evaluation

____________________________________________________________________________



Name: SEGUN CRAWFORD

MRN: [id number]                Age: 41 yrs

Gender: Female                 : 1975

Patient Status: Outpatient     Patient Location: 

Account #: [id number]

Study Date: 2017 12:53 PM

____________________________________________________________________________



Procedure: Color flow and duplex imaging of the veins of the right lower

extremity as well as the left Common Femoral vein.

Reason For Study: RLE SWELLING





Ordering Physician: MONIKA MATHIAS

Performed By: Sue Parra

____________________________________________________________________________



____________________________________________________________________________





Right Sided Venous Evaluation

Enlarged vessel with echogenic content in the Gastrocnemius vein.

Otherwise normal vessel filling wall to wall, compression and augmentation

as well as Colour flow down to the infrageniculate veins.



Left Sided Venous Evaluation

The left common femoral vein is fully compressible. Spontaneous and phasic

flow is present in the left common femoral vein.



Critical Findings

Discussed with Dr Mathias at the [facility] clinic.

____________________________________________________________________________





Interpretation Summary

Deep venous thrombosis in the Gastrocnemius vein. On the right.

The patient will be transferred to the  for management.

____________________________________________________________________________



____________________________________________________________________________



Electronically signed by:      Lennox Williams      on 2017 01:25 PM



CC: MONIKA MATHIAS

>

Williams, Lennox

## 2017-03-28 NOTE — ER DOCUMENT REPORT
ED Medical Screen (RME)





- General


Stated Complaint: LEG PAIN


Notes: 


Patient sent to the emergency room by primary care physician for DVT of right 

leg.  Had an outpatient scan today.  Patient denies chest pain or shortness of 

breath.  Denies recent long distance travel or prolonged sitting.  No previous 

history of DVT.  Patient was hospitalized for 2-3 days for IV antibiotics on 

the 22nd because they thought the area was a cellulitis.





I have greeted and performed a rapid initial assessment of this patient.  A 

comprehensive ED assessment and evaluation of the patient, analysis of test 

results and completion of the medical decision making process will be conducted 

by additional ED providers.


TRAVEL OUTSIDE OF THE [place] IN LAST 30 DAYS: No





- Related Data


Allergies/Adverse Reactions: 


 





No Known Allergies Allergy (Verified 03/28/17 13:59)


 











Past Medical History





- Past Medical History


Cardiac Medical History: 


   Denies: Hx Congestive Heart Failure, Hx DVT, Hx Heart Attack, Hx 

Hypercholesterolemia, Hx Hypertension, Hx Pulmonary Embolism


Pulmonary Medical History: 


   Denies: Hx Asthma, Hx COPD, Hx Sleep Apnea


Neurological Medical History: Denies: Hx Seizures


Endocrine Medical History: Reports: Hx Diabetes Mellitus Type 2.  Denies: Hx 

Hyperthyroidism, Hx Hypothyroidism


Renal/ Medical History: Denies: Hx Peritoneal Dialysis


GI Medical History: Denies: Hx Cirrhosis, Hx Hepatitis


Psychiatric Medical History: 


   Denies: Hx Depression


Infectious Medical History: Denies: Hx C-Diff, Hx Hepatitis, Hx MRSA


Past Surgical History: Reports: Other - Excision of benign cervical polyp





- Immunizations


Hx Diphtheria, Pertussis, Tetanus Vaccination: Yes





Physical Exam





- Vital signs


Vitals: 





 











Temp Pulse Resp BP Pulse Ox


 


 98.3 F   80   20   137/87 H  100 


 


 03/28/17 13:56  03/28/17 13:56  03/28/17 13:56  03/28/17 13:56  03/28/17 13:56














- Extremities


Notes: 


Firm and tender swollen area right lateral lower leg.





Course





- Vital Signs


Vital signs: 





 











Temp Pulse Resp BP Pulse Ox


 


 98.3 F   80   20   137/87 H  100 


 


 03/28/17 13:56  03/28/17 13:56  03/28/17 13:56  03/28/17 13:56  03/28/17 13:56

## 2017-03-28 NOTE — ER DOCUMENT REPORT
ED Extremity Problem, Lower





- General


Chief Complaint: Leg Pain


Stated Complaint: LEG PAIN


Mode of Arrival: Ambulatory


Information source: Patient


Notes: 


41-year-old well-appearing female presents to the emergency department referred 

by PCP for positive DVT on ultrasound.  Patient audrey has had persistent pain 

to her right lower leg/lateral calf area over the last 2 weeks.  Audrey was 

recently admitted and discharged for cellulitis to area and has been taking 

clindamycin as prescribed.  Reports initially had redness and warmth along with 

the swelling to area and states redness and warmth has resolved but pain and 

localized swelling have persisted.   had an outpatient ultrasound done 

today which was positive for DVT and was referred to the ED by her primary care 

provider.  Denies fever, chest pain, shortness of breath, abdominal pain, 

extremity paresthesias or discoloration.


TRAVEL OUTSIDE OF THE [place] IN LAST 30 DAYS: No





- HPI


Patient complains to provider of: Pain, Swelling


Location: Leg


Onset/Duration: Persistent


Quality of pain: Throbbing


Severity: Moderate


Pain Level: 4


Recent injury: No


Exacerbated by: Movement, Walking


Relieved by: Elevation, Rest





- Related Data


Allergies/Adverse Reactions: 


 





No Known Allergies Allergy (Verified 03/28/17 13:59)


 











Past Medical History





- General


Information source: Patient





- Social History


Smoking Status: Current Every Day Smoker


Chew tobacco use (# tins/day): No


Frequency of alcohol use: None


Drug Abuse: None


Lives with: Family


Family History: Reviewed & Not Pertinent, DM


Patient has suicidal ideation: No


Patient has homicidal ideation: No





- Past Medical History


Cardiac Medical History: 


   Denies: Hx Congestive Heart Failure, Hx DVT, Hx Heart Attack, Hx 

Hypercholesterolemia, Hx Hypertension, Hx Pulmonary Embolism


Pulmonary Medical History: 


   Denies: Hx Asthma, Hx COPD, Hx Sleep Apnea


Neurological Medical History: Denies: Hx Seizures


Endocrine Medical History: Reports: Hx Diabetes Mellitus Type 2.  Denies: Hx 

Hyperthyroidism, Hx Hypothyroidism


Renal/ Medical History: Denies: Hx Peritoneal Dialysis


GI Medical History: Denies: Hx Cirrhosis, Hx Hepatitis


Psychiatric Medical History: 


   Denies: Hx Depression


Infectious Medical History: Denies: Hx C-Diff, Hx Hepatitis, Hx MRSA


Surgical Hx: Negative


Past Surgical History: Reports: Other - Excision of benign cervical polyp





- Immunizations


Hx Diphtheria, Pertussis, Tetanus Vaccination: Yes





Review of Systems





- Review of Systems


Constitutional: No symptoms reported


EENT: No symptoms reported


Cardiovascular: No symptoms reported


Respiratory: No symptoms reported


Gastrointestinal: No symptoms reported


Genitourinary: No symptoms reported


Female Genitourinary: No symptoms reported


Musculoskeletal: See HPI


Skin: No symptoms reported


Hematologic/Lymphatic: No symptoms reported


Neurological/Psychological: No symptoms reported


-: Yes All other systems reviewed and negative





Physical Exam





- Vital signs


Vitals: 


 











Temp Pulse Resp BP Pulse Ox


 


 98.3 F   80   20   137/87 H  100 


 


 03/28/17 13:56  03/28/17 13:56  03/28/17 13:56  03/28/17 13:56  03/28/17 13:56














- General


General appearance: Appears well, Alert


In distress: None





- HEENT


Head: Normocephalic, Atraumatic


Eyes: Normal


Pupils: PERRL





- Respiratory


Respiratory status: No respiratory distress


Chest status: Nontender


Breath sounds: Normal - CTAB


Chest palpation: Normal





- Cardiovascular


Rhythm: Regular


Heart sounds: Normal auscultation


Murmur: No


Pulses: Normal: Radial, Posterior tibial, Dorsalis pedis


Normal capillary refill: Yes





- Abdominal


Inspection: Normal


Distension: No distension


Bowel sounds: Normal


Tenderness: Nontender


Organomegaly: No organomegaly





- Back


Back: Normal, Nontender





- Extremities


General upper extremity: Normal inspection, Nontender, Normal color, Normal ROM

, Normal strength, Normal temperature.  No: Tender, Edema


General lower extremity: Normal inspection, Nontender, Normal color, Normal ROM

, Normal strength, Normal temperature, Normal weight bearing, Merary's sign - 

Positive right leg.  No: Tender, Edema


Hip: Normal, Nontender


Thigh: Normal, Nontender


Knee: Normal, Nontender


Calf: Tender - Mild tenderness on palpation to right lateral mid calf area with 

slight localized swelling and induration.  No erythema or warmth.  Distal motor 

and neurovascular function intact with immediate capillary refill, palpable 

pedal pulses, and immediate sensation.


Ankle: Normal, Nontender


Foot: Normal, Nontender





- Neurological


Neuro grossly intact: Yes


Cognition: Normal


Orientation: AAOx4


Theodore Coma Scale Eye Opening: Spontaneous


Theodore Coma Scale Verbal: Oriented


[place] Coma Scale Motor: Obeys Commands


Theodore Coma Scale Total: 15


Speech: Normal


Motor strength normal: LUE, RUE, LLE, RLE


Sensory: Normal





- Skin


Skin Temperature: Warm


Skin Moisture: Dry


Skin Color: Normal





Course





- Re-evaluation


Re-evalutation: 





03/28/17 20:30


Patient hemodynamically stable, in no distress, afebrile. Reviewed outpatient 

ultrasound from earlier today which shows DVT to right gastrocnemius vein.  

Patient's motor and neurovascular function is intact. First dose Xarelto given 

in ED. Spoke with on-call discharge planner/ Manny Lozano who will 

follow-up with patient tomorrow morning to ensure/facilitate patient obtains 

the rest of her Xarelto prescription and has adequate follow-up with pcp. Pt 

appears stable for discharge and outpatient treatment at this time, discussed 

at length with patient home care, follow-up, and ED return precautions. Pt 

presentation, findings, and plan consulted with ED physician Dr. Owens who 

concurs with evaluation and treatment. 








- Vital Signs


Vital signs: 


 











Temp Pulse Resp BP Pulse Ox


 


 98.3 F   80   20   137/87 H  100 


 


 03/28/17 13:56  03/28/17 13:56  03/28/17 13:56  03/28/17 13:56  03/28/17 13:56














- Laboratory


Result Diagrams: 


 03/28/17 14:20





 03/28/17 14:20


Laboratory results interpreted by me: 


 











  03/28/17 03/28/17





  14:20 14:20


 


WBC  14.3 H 


 


RBC  5.34 H 


 


Hgb  15.7 H 


 


Absolute Lymphocytes  5.6 H 


 


Creatinine   0.46 L


 


Glucose   115 H


 


Calcium   10.4 H














- Diagnostic Test


Radiology reviewed: Image reviewed, Reports reviewed





Discharge





- Discharge


Clinical Impression: 


DVT (deep venous thrombosis)


Qualifiers:


 DVT location: lower extremity Affected thrombotic vein of extremity: other 

lower extremity vein Laterality: right Chronicity: acute Qualified Code(s): 

I82.491 - Acute embolism and thrombosis of other specified deep vein of right 

lower extremity





Condition: Stable


Disposition: HOME, SELF-CARE


Instructions:  DVT Outpatient Treatment ([facility]), Oral Narcotic Medication ([facility])


Additional Instructions: 


Take the Xarelto as directed: 15 mg tablet twice daily for the first 21 days, 

then 20 mg once daily for the remainder of treatment. 


You have been given your first dose in the Emergency Department today and a 

prescription provided for the first 21 days of treatment. 


Follow-up with your primary care provider in the next 1-2 days for re-

evaluation and arrangement of your long term treatment. 


You should received a call tomorrow morning from the discharge planner/social 

worker.


If you do not receive a call, call  [phone] to speak with the discharge 

planner. 


Return to the Emergency Department for any worsening symptoms or concerns. 


Prescriptions: 


Oxycodone HCl/Acetaminophen [Percocet 5-325 mg Tablet] 1 tab PO Q6HP PRN #10 

tablet


 PRN Reason: 


Rivaroxaban [Xarelto] 15 mg PO BID 21 Days


Forms:  Elevated Blood Pressure, Smoking Cessation Education, Return to Work

## 2017-03-29 VITALS — SYSTOLIC BLOOD PRESSURE: 137 MMHG | DIASTOLIC BLOOD PRESSURE: 79 MMHG

## 2017-06-05 ENCOUNTER — HOSPITAL ENCOUNTER (OUTPATIENT)
Dept: HOSPITAL 62 - WI | Age: 42
End: 2017-06-05
Payer: COMMERCIAL

## 2017-06-05 DIAGNOSIS — Z12.31: Primary | ICD-10-CM

## 2017-06-05 PROCEDURE — G0202 SCR MAMMO BI INCL CAD: HCPCS

## 2017-06-05 PROCEDURE — 77067 SCR MAMMO BI INCL CAD: CPT

## 2017-06-05 NOTE — WOMENS IMAGING REPORT
EXAM DESCRIPTION:  BILAT SCREENING MAMMO W/CAD



COMPLETED DATE/TIME:  6/5/2017 10:47 am



REASON FOR STUDY:  Z12.31, ROUTINE SCREENING MAMMO Z12.31  ENCNTR SCREEN MAMMOGRAM FOR MALIGNANT NEOP
LASM OF JAMES



COMPARISON:  Baseline study



TECHNIQUE:  Standard craniocaudal and mediolateral oblique views of each breast recorded using [employer]
l acquisition.



LIMITATIONS:  None.



FINDINGS:  Findings present which are benign by mammographic criteria.  No suspicious masses, calcifi
cations or architectural distortion.

Pertinent benign findings: Scattered benign-appearing calcifications bilaterally.

Read with the assistance of CAD.

.[facility] - R2 Cenova Version 1.3

.[facility] - R2 Cenova Version 1.3

.[facility] - R2 Cenova Version 2.4

[place] [place] 2.[place]

.[facility] - R2  Version 9.2

Benign mammographic findings may include one or more of the following:  Smooth masses, popcorn/rim/co
arse calcifications, asymmetries, post-procedure changes, and lesions with long-standing stability.



IMPRESSION:  BENIGN MAMMOGRAPHIC FINDINGS.  BIRADS 2



BREAST DENSITY:  c. The breasts are heterogeneously dense, which may obscure small masses.



BIRAD:  2 BENIGN FINDING(S)



RECOMMENDATION:  ROUTINE SCREENING



COMMENT:  The patient has been notified of the results by letter per SA requirements. Additional no
tification policies are in place for contacting patient with suspicious or incomplete findings.

Quality ID #225: The American College of Radiology recommends an annual screening mammogram for women
 aged 40 years or over. This facility utilizes a reminder system to ensure that all patients receive 
reminder letters, and/or direct phone calls for appointments. This includes reminders for routine scr
eening mammograms, diagnostic mammograms, or other Breast Imaging Interventions when appropriate.  Th
is patient will be placed in the appropriate reminder system.

The American College of Radiology (ACR) has developed recommendations for screening MRI of the breast
s in certain patient populations, to be used in conjunction with mammography.  Breast MRI surveillanc
e may be appropriate for women with more than 20% lifetime risk of developing breast cancer  as deter
mined by genetic testing, significant family history of the disease, or history of mantle radiation f
or Hodgkins Disease.  ACR Practice Guidelines 2008.



TECHNICAL DOCUMENTATION:  FINDING NUMBER: (1)

ASSESSMENT: (1)

JOB ID:  [id number]

 2011 [email or web address]- All Rights Reserved

## 2017-10-20 ENCOUNTER — HOSPITAL ENCOUNTER (EMERGENCY)
Dept: HOSPITAL 62 - ER | Age: 42
Discharge: HOME | End: 2017-10-20
Payer: SELF-PAY

## 2017-10-20 VITALS — SYSTOLIC BLOOD PRESSURE: 146 MMHG | DIASTOLIC BLOOD PRESSURE: 97 MMHG

## 2017-10-20 DIAGNOSIS — Z86.718: ICD-10-CM

## 2017-10-20 DIAGNOSIS — E11.42: Primary | ICD-10-CM

## 2017-10-20 DIAGNOSIS — F17.210: ICD-10-CM

## 2017-10-20 DIAGNOSIS — Z79.02: ICD-10-CM

## 2017-10-20 LAB
ALBUMIN SERPL-MCNC: 4.4 G/DL (ref 3.5–5)
ALP SERPL-CCNC: 79 U/L (ref 38–126)
ALT SERPL-CCNC: 36 U/L (ref 9–52)
ANION GAP SERPL CALC-SCNC: 14 MMOL/L (ref 5–19)
AST SERPL-CCNC: 18 U/L (ref 14–36)
BASOPHILS # BLD AUTO: 0.2 10^3/UL (ref 0–0.2)
BASOPHILS NFR BLD AUTO: 0.9 % (ref 0–2)
BILIRUB DIRECT SERPL-MCNC: 0.3 MG/DL (ref 0–0.4)
BILIRUB SERPL-MCNC: 0.5 MG/DL (ref 0.2–1.3)
BUN SERPL-MCNC: 11 MG/DL (ref 7–20)
CALCIUM: 10 MG/DL (ref 8.4–10.2)
CHLORIDE SERPL-SCNC: 101 MMOL/L (ref 98–107)
CO2 SERPL-SCNC: 24 MMOL/L (ref 22–30)
CREAT SERPL-MCNC: 0.6 MG/DL (ref 0.52–1.25)
EOSINOPHIL # BLD AUTO: 0.3 10^3/UL (ref 0–0.6)
EOSINOPHIL NFR BLD AUTO: 1.4 % (ref 0–6)
ERYTHROCYTE [DISTWIDTH] IN BLOOD BY AUTOMATED COUNT: 13 % (ref 11.5–14)
GLUCOSE SERPL-MCNC: 309 MG/DL (ref 75–110)
HCT VFR BLD CALC: 47.1 % (ref 36–47)
HGB BLD-MCNC: 16.9 G/DL (ref 12–15.5)
HGB HCT DIFFERENCE: 3.6
LYMPHOCYTES # BLD AUTO: 5.4 10^3/UL (ref 0.5–4.7)
LYMPHOCYTES NFR BLD AUTO: 29.2 % (ref 13–45)
MCH RBC QN AUTO: 30.9 PG (ref 27–33.4)
MCHC RBC AUTO-ENTMCNC: 35.8 G/DL (ref 32–36)
MCV RBC AUTO: 86 FL (ref 80–97)
MONOCYTES # BLD AUTO: 1.1 10^3/UL (ref 0.1–1.4)
MONOCYTES NFR BLD AUTO: 5.8 % (ref 3–13)
NEUTROPHILS # BLD AUTO: 11.6 10^3/UL (ref 1.7–8.2)
NEUTS SEG NFR BLD AUTO: 62.7 % (ref 42–78)
POTASSIUM SERPL-SCNC: 4.2 MMOL/L (ref 3.6–5)
PROT SERPL-MCNC: 7.4 G/DL (ref 6.3–8.2)
RBC # BLD AUTO: 5.45 10^6/UL (ref 3.72–5.28)
SODIUM SERPL-SCNC: 138.7 MMOL/L (ref 137–145)
WBC # BLD AUTO: 18.5 10^3/UL (ref 4–10.5)

## 2017-10-20 PROCEDURE — 85025 COMPLETE CBC W/AUTO DIFF WBC: CPT

## 2017-10-20 PROCEDURE — 36415 COLL VENOUS BLD VENIPUNCTURE: CPT

## 2017-10-20 PROCEDURE — 99284 EMERGENCY DEPT VISIT MOD MDM: CPT

## 2017-10-20 PROCEDURE — 80053 COMPREHEN METABOLIC PANEL: CPT

## 2017-10-20 PROCEDURE — 93970 EXTREMITY STUDY: CPT

## 2017-10-20 NOTE — ER DOCUMENT REPORT
ED General





- General


Chief Complaint: Leg Pain


Stated Complaint: LEG PAIN


Time Seen by Provider: 10/20/17 12:45


Mode of Arrival: Wheelchair


Information source: Patient


Notes: 





42-year-old diabetic female history of DVT presents with complaints of right 

leg pain.  Patient notes her blood sugars have been in the 300s and she has 

been working on this with her primary care physician but they do not have it 

under control.  Patient is on Xarelto notes that she was having leg pain and 

her primary care physician was concerned about a another DVT and was sent in 

for evaluation


TRAVEL OUTSIDE OF THE [place] IN LAST 30 DAYS: No





- HPI


Onset: Last week


Onset/Duration: Persistent


Quality of pain: Sharp


Severity: Mild


Pain Level: 1


Associated symptoms: Other


Exacerbated by: Movement, Walking


Relieved by: Denies


Similar symptoms previously: Yes


Recently seen / treated by doctor: Yes





- Related Data


Allergies/Adverse Reactions: 


 





No Known Allergies Allergy (Verified 03/28/17 13:59)


 











Past Medical History





- General


Information source: Patient





- Social History


Smoking Status: Current Every Day Smoker


Cigarette use (# per day): Yes


Chew tobacco use (# tins/day): No


Smoking Education Provided: No


Frequency of alcohol use: None


Drug Abuse: None


Family History: Reviewed & Not Pertinent, DM


Patient has suicidal ideation: No


Patient has homicidal ideation: No





- Past Medical History


Cardiac Medical History: 


   Denies: Hx Congestive Heart Failure, Hx DVT, Hx Heart Attack, Hx 

Hypercholesterolemia, Hx Hypertension, Hx Pulmonary Embolism


Pulmonary Medical History: 


   Denies: Hx Asthma, Hx COPD, Hx Sleep Apnea


Neurological Medical History: Denies: Hx Seizures


Endocrine Medical History: Reports: Hx Diabetes Mellitus Type 2.  Denies: Hx 

Hyperthyroidism, Hx Hypothyroidism


Renal/ Medical History: Denies: Hx Peritoneal Dialysis


GI Medical History: Denies: Hx Cirrhosis, Hx Hepatitis


Psychiatric Medical History: 


   Denies: Hx Depression


Infectious Medical History: Denies: Hx C-Diff, Hx Hepatitis, Hx MRSA


Past Surgical History: Reports: Other - Excision of benign cervical polyp





- Immunizations


Hx Diphtheria, Pertussis, Tetanus Vaccination: Yes





Review of Systems





- Review of Systems


Notes: 





REVIEW OF SYSTEMS:


CONSTITUTIONAL :  Denies fever,  chills, or sweats.  Denies recent illness.


EENT:   Denies eye, ear, throat, or mouth pain or symptoms.  Denies nasal or 

sinus congestion or discharge.  Denies throat, tongue, or mouth swelling or 

difficulty swallowing.


CARDIOVASCULAR:  Denies chest pain.  Denies palpitations or racing or irregular 

heart beat.  Denies ankle edema.


RESPIRATORY:  Denies cough, cold, or chest congestion.  Denies shortness of 

breath, difficulty breathing, or wheezing.


GASTROINTESTINAL:  Denies abdominal pain or distention.  Denies nausea, vomiting

, or diarrhea.  Denies blood in vomitus, stools, or per rectum.  Denies black, 

tarry stools.  Denies constipation. 


GENITOURINARY:  Denies difficulty urinating, painful urination, burning, 

frequency, blood in urine, or discharge.


FEMALE  GENITOURINARY:  Denies vaginal bleeding, heavy or abnormal periods, 

irregular periods.  Denies vaginal discharge or odor. 


MUSCULOSKELETAL: Admits to right leg pain


SKIN:   Denies rash, lesions or sores.


HEMATOLOGIC :   Denies easy bruising or bleeding.


LYMPHATIC:  Denies swollen, enlarged glands.


NEUROLOGICAL:  Denies confusion or altered mental status.  Denies passing out 

or loss of consciousness.  Denies dizziness or lightheadedness.  Denies 

headache.  Denies weakness or paralysis or loss of use of either side.  Denies 

problems with gait or speech.  Denies sensory loss, numbness, or tingling.  

Denies seizures.


PSYCHIATRIC:  Denies anxiety or stress.  Denies depression, suicidal ideation, 

or homicidal ideation.





ALL OTHER SYSTEMS REVIEWED AND NEGATIVE.











PHYSICAL EXAMINATION:





GENERAL: Well-appearing, well-nourished and in no acute distress.





HEAD: Atraumatic, normocephalic.





EYES: Pupils equal round and reactive to light, extraocular movements intact, 

conjunctiva are normal.





ENT: Nares patent, oropharynx clear without exudates.  Moist mucous membranes.





NECK: Normal range of motion, supple without lymphadenopathy





LUNGS: Breath sounds clear to auscultation bilaterally and equal.  No wheezes 

rales or rhonchi.





HEART: Regular rate and rhythm without murmurs





ABDOMEN: Soft, nontender, nondistended abdomen.  No guarding, no rebound.  No 

masses appreciated.





Female : deferred





Musculoskeletal: Normal range of motion, no pitting or edema.  No cyanosis.





NEUROLOGICAL: Cranial nerves grossly intact.  Normal speech, normal gait.  

Normal sensory, motor exams





PSYCH: Normal mood, normal affect.





SKIN: Warm, Dry, normal turgor, no rashes or lesions noted.

















Dictation was performed using Dragon voice recognition software





Physical Exam





- Vital signs


Vitals: 





 











Temp Pulse Resp BP Pulse Ox


 


 98.7 F   92   20   157/104 H  100 


 


 10/20/17 12:18  10/20/17 12:18  10/20/17 12:18  10/20/17 12:18  10/20/17 12:18














Course





- Re-evaluation


Re-evalutation: 





10/20/17 14:35


Doppler was performed no acute abnormality was noted.  Patient overall looks 

well is in no distress.  I believe her symptoms may be secondary to neuropathy 

given how elevated her glucose has been in the past.  Patient has been given 

very strict return precautions regarding this states she understands and will 

follow up with primary care physician








After performing a Medical Screening Examination, I estimate there is LOW risk 

for OPEN FRACTURE, COMPARTMENT SYNDROME, TENDON RUPTURE, ACUTE NEUROVASCULAR 

INJURY, or RETAINED FOREIGN BODY, thus I consider the discharge disposition 

reasonable. Also, there is no evidence or peritonitis, sepsis, or toxicity.  I 

have reevaluated this patient multiple times and no significant life 

threatening changes are noted. The patient and I have discussed the diagnosis 

and risks, and we agree with discharging home with close follow-up with the 

understanding that symptoms and presentations can change. We also discussed 

returning to the Emergency Department immediately if new or worsening symptoms 

occur. We have discussed the symptoms which are most concerning (e.g., changing 

or worsening pain, fever, numbness, weakness, cool or painful digits) that 

necessitate immediate return.





- Vital Signs


Vital signs: 





 











Temp Pulse Resp BP Pulse Ox


 


 98.7 F   92   20   157/104 H  100 


 


 10/20/17 12:18  10/20/17 12:18  10/20/17 12:18  10/20/17 12:18  10/20/17 12:18














- Laboratory


Result Diagrams: 


 10/20/17 12:45





 10/20/17 12:45


Laboratory results interpreted by me: 





 











  10/20/17 10/20/17





  12:45 12:45


 


WBC  18.5 H 


 


RBC  5.45 H 


 


Hgb  16.9 H 


 


Hct  47.1 H 


 


Absolute Neutrophils  11.6 H 


 


Absolute Lymphocytes  5.4 H 


 


Glucose   309 H














- Diagnostic Test


Radiology reviewed: Image reviewed, Reports reviewed





Discharge





- Discharge


Clinical Impression: 


 Neuralgia, Diabetes mellitus type 2 in nonobese





Condition: Stable


Disposition: HOME, SELF-CARE


Instructions:  Neuralgia ([facility])


Prescriptions: 


Gabapentin [Neurontin 300 mg Capsule] 300 mg PO Q12 #60 capsule


Referrals: 


EDWIN ALICIA MD [Primary Care Provider] - Follow up as needed

## 2017-10-20 NOTE — ER DOCUMENT REPORT
ED Medical Screen (RME)





- General


Chief Complaint: Leg clot


Stated Complaint: LEG PAIN


Time Seen by Provider: 10/20/17 12:45


Mode of Arrival: Wheelchair


Information source: Patient


TRAVEL OUTSIDE OF THE [place] IN LAST 30 DAYS: No





- HPI


Patient complains to provider of: bilateral leg pain


Onset: Other - pt. with 2 day h/o bilateral leg pain.  Has h/o DVT in R leg -- 

currently on blood thinners





- Related Data


Allergies/Adverse Reactions: 


 





No Known Allergies Allergy (Verified 03/28/17 13:59)


 











Past Medical History





- Social History


Chew tobacco use (# tins/day): No


Frequency of alcohol use: None


Drug Abuse: None





- Past Medical History


Cardiac Medical History: 


   Denies: Hx Congestive Heart Failure, Hx DVT, Hx Heart Attack, Hx 

Hypercholesterolemia, Hx Hypertension, Hx Pulmonary Embolism


Pulmonary Medical History: 


   Denies: Hx Asthma, Hx COPD, Hx Sleep Apnea


Neurological Medical History: Denies: Hx Seizures


Endocrine Medical History: Reports: Hx Diabetes Mellitus Type 2.  Denies: Hx 

Hyperthyroidism, Hx Hypothyroidism


Renal/ Medical History: Denies: Hx Peritoneal Dialysis


GI Medical History: Denies: Hx Cirrhosis, Hx Hepatitis


Psychiatric Medical History: 


   Denies: Hx Depression


Infectious Medical History: Denies: Hx C-Diff, Hx Hepatitis, Hx MRSA


Past Surgical History: Reports: Other - Excision of benign cervical polyp





- Immunizations


Hx Diphtheria, Pertussis, Tetanus Vaccination: Yes





Physical Exam





- Vital signs


Vitals: 





 











Temp Pulse Resp BP Pulse Ox


 


 98.7 F   92   20   157/104 H  100 


 


 10/20/17 12:18  10/20/17 12:18  10/20/17 12:18  10/20/17 12:18  10/20/17 12:18














Course





- Vital Signs


Vital signs: 





 











Temp Pulse Resp BP Pulse Ox


 


 98.7 F   92   20   157/104 H  100 


 


 10/20/17 12:18  10/20/17 12:18  10/20/17 12:18  10/20/17 12:18  10/20/17 12:18

## 2017-10-21 NOTE — XCELERA REPORT
[address] [address]

                             Tel: [phone]

                             Fax: [phone]



                     Lower Extremity Venous Evaluation

____________________________________________________________________________



Name: SEGUN CRAWFORD

MRN: [id number]                Age: 42 yrs

Gender: Female                 : 1975

Patient Status: Emergency      Patient Location: ER

Account #: [id number]

Study Date: 10/20/2017 01:24 PM

Accession #: [id number]

____________________________________________________________________________



Procedure: Color flow and duplex imaging bilaterally of the veins of the

lower extremities as well as the Common Femoral veins.

Reason For Study: bilateral leg pain





Ordering Physician: IRENE WATERS

Performed By: Faib Zaidi

____________________________________________________________________________



____________________________________________________________________________





Right Sided Venous Evaluation

Normal vessel filling wall to wall, compression and augmentation as well as

Colour flow down to the infrageniculate veins.



Left Sided Venous Evaluation

Normal vessel filling wall to wall, compression and augmentation as well as

Colour flow down to the infrageniculate veins.

____________________________________________________________________________





Interpretation Summary

No duplex evidence of DVT or obstruction in the bilateral lower

extremities.

____________________________________________________________________________



____________________________________________________________________________



Electronically signed by:      Lennox Williams      on 10/21/2017 08:06 AM



CC: IRENE WATERS

>

Williams, Lennox

## 2017-12-19 ENCOUNTER — HOSPITAL ENCOUNTER (EMERGENCY)
Dept: HOSPITAL 62 - ER | Age: 42
Discharge: HOME | End: 2017-12-19
Payer: SELF-PAY

## 2017-12-19 VITALS — DIASTOLIC BLOOD PRESSURE: 95 MMHG | SYSTOLIC BLOOD PRESSURE: 155 MMHG
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
## 2017-12-19 DIAGNOSIS — F17.200: ICD-10-CM

## 2017-12-19 DIAGNOSIS — S92.525A: Primary | ICD-10-CM

## 2017-12-19 PROCEDURE — 99283 EMERGENCY DEPT VISIT LOW MDM: CPT

## 2017-12-19 NOTE — ER DOCUMENT REPORT
ED General





- General


Chief Complaint: Toe Injury


Stated Complaint: TOE INJURY


Time Seen by Provider: 12/19/17 02:53


Notes: 


Patient is a 42-year-old female who presents with complaints of hurting her 

left fifth toe by actually taking the bed.  She denies any other injuries or 

complaints.  She has some pain shooting into her foot on the lateral aspect of 

the foot.  She denies pain to the rest of the foot.  She denies pain to any 

other toes other than the left fifth toe.





TRAVEL OUTSIDE OF THE [place] IN LAST 30 DAYS: No





- Related Data


Allergies/Adverse Reactions: 


 





No Known Allergies Allergy (Verified 03/28/17 13:59)


 











Past Medical History





- Social History


Smoking Status: Current Every Day Smoker


Chew tobacco use (# tins/day): No


Frequency of alcohol use: None


Drug Abuse: None


Family History: Reviewed & Not Pertinent, DM


Patient has suicidal ideation: No


Patient has homicidal ideation: No





- Past Medical History


Cardiac Medical History: 


   Denies: Hx Congestive Heart Failure, Hx DVT, Hx Heart Attack, Hx 

Hypercholesterolemia, Hx Hypertension, Hx Pulmonary Embolism


Pulmonary Medical History: 


   Denies: Hx Asthma, Hx COPD, Hx Sleep Apnea


Neurological Medical History: Denies: Hx Seizures


Endocrine Medical History: Reports: Hx Diabetes Mellitus Type 2.  Denies: Hx 

Hyperthyroidism, Hx Hypothyroidism


Renal/ Medical History: Denies: Hx Peritoneal Dialysis


GI Medical History: Denies: Hx Cirrhosis, Hx Hepatitis


Psychiatric Medical History: 


   Denies: Hx Depression


Infectious Medical History: Denies: Hx C-Diff, Hx Hepatitis, Hx MRSA


Past Surgical History: Reports: Other - Excision of benign cervical polyp





- Immunizations


Hx Diphtheria, Pertussis, Tetanus Vaccination: Yes





Review of Systems





- Review of Systems


Notes: 





My Normal Review Basic





REVIEW OF SYSTEMS:


MUSCULOSKELETAL: Left fifth toe pain


SKIN:   Denies rash or skin lesions.


NEUROLOGICAL: Denies sensory or motor loss.


ALL OTHER SYSTEMS REVIEWED AND NEGATIVE.





Physical Exam





- Vital signs


Vitals: 


 











Temp Pulse Resp BP Pulse Ox


 


 98.5 F   88   20   173/88 H  98 


 


 12/19/17 02:39  12/19/17 02:39  12/19/17 02:39  12/19/17 02:39  12/19/17 02:39














- Notes


Notes: 





General Appearance: Well nourished, alert, cooperative, no acute distress, mild 

obvious discomfort.


Vitals: reviewed, See vital signs table.


Extremities: strength 5/5 in all extremities, good pulses in all extremities, 

patient has some bruising and swelling to left fifth toe.  Patient does have 

some pain to palpation over the distal left metatarsal.  Remainder foot is 

nontender.


Skin: warm, dry, appropriate color, no rash


Neuro: speech clear, oriented x 3, normal affect, responds appropriately to 

questions.





Course





- Re-evaluation


Re-evalutation: 





12/19/17 05:20


Patient does have a fracture of the left fifth toe.  The remainder of the 

distal foot is no evidence of fracture.  Patient have her toes markel taped and 

then will be given crutches.  I encouraged her return to ER if she has 

worsening pain or swelling if she feels unwell.  Patient agrees with plan will 

be discharged home.





Dictation of this chart was performed using voice recognition software; 

therefore, there may be some unintended grammatical errors.





- Vital Signs


Vital signs: 


 











Temp Pulse Resp BP Pulse Ox


 


 98.4 F   97   18   155/95 H  99 


 


 12/19/17 04:05  12/19/17 04:05  12/19/17 04:05  12/19/17 04:05  12/19/17 04:05














Discharge





- Discharge


Clinical Impression: 


Toe fracture, left


Qualifiers:


 Encounter type: initial encounter Toe: lesser toe Fracture type: closed Phalanx

: middle Fracture alignment: nondisplaced Qualified Code(s): S92.525A - 

Nondisplaced fracture of middle phalanx of left lesser toe(s), initial 

encounter for closed fracture





Condition: Good


Disposition: HOME, SELF-CARE


Additional Instructions: 


Please continue to markel tape your toe for the next 6 weeks. Use the crutches 

for the first week. After the first week you can attempt to see if you have 

pain with walking. If you do not have much pain you can stop using the crutches

, but no running for at least 4-6 week. If you continue to have pain after one 

week than continue to use the crutches for another week. please follow up with 

your doctor in 1 week for reevaluation.

## 2017-12-19 NOTE — RADIOLOGY REPORT (SQ)
EXAM DESCRIPTION: 



TOE LEFT



CLINICAL HISTORY: 



42 years, Female, trauma



COMPARISON:

None.



NUMBER OF VIEWS:

3



LIMITATIONS:

None.



FINDINGS:



No acute defect of the left fifth toe. 0.3 cm developmental

well-corticated calcification at the lateral aspect of the left

fifth proximal interphalangeal joint. Small left navicular

osteophytosis.



IMPRESSION:



No acute findings.

 



 2011 [employer] [facility]- All Rights Reserved

## 2018-01-24 ENCOUNTER — HOSPITAL ENCOUNTER (EMERGENCY)
Dept: HOSPITAL 62 - ER | Age: 43
Discharge: HOME | End: 2018-01-24
Payer: SELF-PAY

## 2018-01-24 VITALS — SYSTOLIC BLOOD PRESSURE: 159 MMHG | DIASTOLIC BLOOD PRESSURE: 97 MMHG

## 2018-01-24 DIAGNOSIS — Z79.84: ICD-10-CM

## 2018-01-24 DIAGNOSIS — S99.922A: Primary | ICD-10-CM

## 2018-01-24 DIAGNOSIS — G89.29: ICD-10-CM

## 2018-01-24 DIAGNOSIS — F17.200: ICD-10-CM

## 2018-01-24 DIAGNOSIS — M79.675: ICD-10-CM
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
## 2018-01-24 DIAGNOSIS — E11.9: ICD-10-CM

## 2018-01-24 PROCEDURE — 73630 X-RAY EXAM OF FOOT: CPT

## 2018-01-24 PROCEDURE — 96372 THER/PROPH/DIAG INJ SC/IM: CPT

## 2018-01-24 PROCEDURE — 99283 EMERGENCY DEPT VISIT LOW MDM: CPT

## 2018-01-24 NOTE — ER DOCUMENT REPORT
ED Extremity Problem, Lower





- General


Chief Complaint: Toe Injury


Stated Complaint: TOE INJURY


Time Seen by Provider: 01/24/18 14:24


Mode of Arrival: Ambulatory


Information source: Patient


TRAVEL OUTSIDE OF THE [place] IN LAST 30 DAYS: No





- HPI


Patient complains to provider of: Pain


Location: 5th Toe


Occurred: Other - 6-7 weeks ago


Where: Home


Quality of pain: Achy


Severity: Moderate


Notes: 





Patient arrives with complaints of left fifth toe pain.  Few weeks prior to 

[place] she stubbed her toe on her foot board of her bed.  She had a toe 

wrapped and was sent home.  She continues to have a lot of pain in this toe.  

She states that toe is also discolored and purple in color.  She denies any 

fevers.  She denies any numbness, tingling, weakness.  She denies any bleeding.

  She is not on any blood thinning medications.  Her only past medical history 

is diabetes for which she takes 2 oral medications for.  She denies any rashes.

  No chest pain or shortness of breath.  No nausea, vomiting, diarrhea.  No 

other complaints at this time.  Pain is worse with touch and walking, nothing 

makes it better.





- Related Data


Allergies/Adverse Reactions: 


 





No Known Allergies Allergy (Verified 01/24/18 11:27)


 











Past Medical History





- Social History


Smoking Status: Current Every Day Smoker


Frequency of alcohol use: None


Drug Abuse: None


Family History: Reviewed & Not Pertinent, DM


Patient has suicidal ideation: No


Patient has homicidal ideation: No





- Past Medical History


Cardiac Medical History: 


   Denies: Hx Congestive Heart Failure, Hx DVT, Hx Heart Attack, Hx 

Hypercholesterolemia, Hx Hypertension, Hx Pulmonary Embolism


Pulmonary Medical History: 


   Denies: Hx Asthma, Hx COPD, Hx Sleep Apnea


Neurological Medical History: Denies: Hx Seizures


Endocrine Medical History: Reports: Hx Diabetes Mellitus Type 2.  Denies: Hx 

Hyperthyroidism, Hx Hypothyroidism


Renal/ Medical History: Denies: Hx Peritoneal Dialysis


GI Medical History: Denies: Hx Cirrhosis, Hx Hepatitis


Psychiatric Medical History: 


   Denies: Hx Depression


Infectious Medical History: Denies: Hx C-Diff, Hx Hepatitis, Hx MRSA


Past Surgical History: Reports: Other - Excision of benign cervical polyp





- Immunizations


Hx Diphtheria, Pertussis, Tetanus Vaccination: Yes





Review of Systems





- Review of Systems


-: Yes All other systems reviewed and negative





Physical Exam





- Notes


Notes: 





GENERAL: alert, cooperative, nontoxic, no distress.


HEAD: normocephalic, atraumatic


EYES: conjunctiva pink without discharge, no external redness or swelling.


EARS: no external swelling, no external redness


NOSE: atraumatic, no external swelling


MOUTH/THROAT: mucous membranes moist and pink


NECK: soft, supple, full range of motion, no meningismus.


CHEST: no distress, lungs clear and equal throughout.  No wheezing, rales, 

rhonchi.


CARDIAC: regular rate and rhythm, no murmur, normal capillary refill, normal 

pulses.  


BACK: full range of motion, no CVA tenderness.


EXTREMITIES: full range of motion of all extremities.  Tenderness to palpation 

of the left fifth toe.  Purple/ecchymotic appearance to the toe.  Normal cap 

refill.  Normal sensation.  No redness, swelling.  Normal pulse to the foot.  

No redness to the foot.


NEURO: alert and oriented 3, no focal deficits, full range of motion of all 

extremities.


PYSCH: appropriate mood, affect.  Patient is cooperative.


SKIN: pink, warm, dry, no rash.








Course





- Re-evaluation


Re-evalutation: 





01/24/18 15:29


The patient is nontoxic appearing with stable vitals.  The patient injured her 

left fifth toe 5-6 weeks ago and continues to have pain in this toe with 

discoloration of the toe.  Toe has normal cap refill and sensation.  She has 

tenderness to palpation of this toe.  There is no redness or sign of infection.

  She has no fever.  X-ray shows likely a chronic injury to the lateral 

collateral ligaments of the left fifth toe.  The patient is neurovascular 

intact.  She will be placed in a postop shoe and will be instructed to follow-

up with podiatry at the next available appointment.  She should follow-up 

sooner for increasing pain, fever, redness, numbness, tingling, weakness, any 

further concerns.





The patient is noted to have elevated blood pressure during today's emergency 

department visit.  The patient was informed of this finding.  The patient was 

instructed that this may be related to pre-hypertension and requires further 

evaluation with a primary care provider.  The patient has no hypertensive 

symptoms at this time.





The patient's emergency department workup and current diagnosis were explained 

to the patient and or family.  Follow-up instructions were provided.  

Medications if prescribed were discussed. Instructions for when to return to 

the emergency department including specific  worrisome symptoms were discussed 

with the patient and/or family.





- Diagnostic Test


Radiology reviewed: Image reviewed, Reports reviewed - Chronic injury to the 

LCL of the left fifth toe.  No acute findings.





Procedures





- Immobilization


  ** Left foot


Pre-Proc Neuro Vasc Exam: Normal


Immobilizer type: Post-op shoe


Performed by: PCT


Post-Proc Neuro Vasc Exam: Normal


Alignment checked and good: Yes





Discharge





- Discharge


Clinical Impression: 


 Chronic toe pain, left foot





Condition: Stable


Disposition: HOME, SELF-CARE


Instructions:  Fractured Toe ([facility])


Additional Instructions: 


Wear postop shoe as needed for comfort.  Take medications as prescribed.  Follow

-up with a podiatrist at the next available appointment.  Follow-up sooner for 

increasing pain, fever, redness of the foot, numbness, tingling, weakness, any 

further concerns.





Your blood pressure was elevated during today's visit.  Have this rechecked 

with your doctor.





The medication you were prescribed today may cause drowsiness.  Do not drive or 

operate heavy machinery while taking this medication.


Prescriptions: 


Tramadol HCl [Ultram 50 mg Tablet] 50 mg PO Q6HP PRN #12 tablet


 PRN Reason: 


Diclofenac Sodium [Voltaren 50 Mg Tablet.Dr] 50 mg PO BID #20 tablet.


Forms:  Elevated Blood Pressure, Smoking Cessation Education


Referrals: 


[facility] [Primary Care Provider] - Follow up as needed


KAZ BLANCHARD DPM [ACTIVE STAFF] - Follow up as needed


RAFAEL THOMAS DPM [ACTIVE STAFF] - Follow up as needed


BLAKE ALDRICH DPM [ACTIVE STAFF] - Follow up as needed


YOLANDE COTTON DPM [ACTIVE STAFF] - Follow up as needed


ZAYDA HOLLOWAY DPM [NO LOCAL MD] - Follow up as needed


GINA HAMMER DPM [NO LOCAL MD] - Follow up as needed

## 2018-01-24 NOTE — RADIOLOGY REPORT (SQ)
EXAM DESCRIPTION:  FOOT LEFT COMPLETE



COMPLETED DATE/TIME:  1/24/2018 2:46 pm



REASON FOR STUDY:  5th toe pain



COMPARISON:  None.



NUMBER OF VIEWS:  Three views.



TECHNIQUE:  AP, lateral and oblique  radiographic images acquired of the left foot.



LIMITATIONS:  None.



FINDINGS:  MINERALIZATION: Normal.

BONES: No acute fracture or malalignment.  Along the lateral aspect of the pinky toe PIP joint, too s
mall calcifications/ossifications are present which could reflect lateral collateral ligament ossific
ation at the joint.  Small dorsal calcaneal spurs at the Achilles attachment.  Mild bony spurring barry
ng the tarsal navicular bone at the talonavicular joint.

JOINTS: No effusions.

SOFT TISSUES: No soft tissue swelling.  No foreign body.

OTHER: No other significant finding.



IMPRESSION:  Suspect chronic injury pinky toe lateral collateral ligament at the PIP joint.

No acute fracture or malalignment.



TECHNICAL DOCUMENTATION:  JOB ID:  [id number]

 2011 [employer]- All Rights Reserved

## 2018-03-17 ENCOUNTER — HOSPITAL ENCOUNTER (EMERGENCY)
Dept: HOSPITAL 62 - ER | Age: 43
Discharge: HOME | End: 2018-03-17
Payer: SELF-PAY

## 2018-03-17 VITALS — SYSTOLIC BLOOD PRESSURE: 177 MMHG | DIASTOLIC BLOOD PRESSURE: 101 MMHG

## 2018-03-17 DIAGNOSIS — R10.13: ICD-10-CM

## 2018-03-17 DIAGNOSIS — E11.65: ICD-10-CM

## 2018-03-17 DIAGNOSIS — I10: ICD-10-CM

## 2018-03-17 DIAGNOSIS — R11.2: Primary | ICD-10-CM

## 2018-03-17 LAB
ADD MANUAL DIFF: NO
ALBUMIN SERPL-MCNC: 4.5 G/DL (ref 3.5–5)
ALP SERPL-CCNC: 68 U/L (ref 38–126)
ALT SERPL-CCNC: 34 U/L (ref 9–52)
ANION GAP SERPL CALC-SCNC: 14 MMOL/L (ref 5–19)
AST SERPL-CCNC: 14 U/L (ref 14–36)
BASOPHILS # BLD AUTO: 0.1 10^3/UL (ref 0–0.2)
BASOPHILS NFR BLD AUTO: 0.8 % (ref 0–2)
BILIRUB DIRECT SERPL-MCNC: 0.3 MG/DL (ref 0–0.4)
BILIRUB SERPL-MCNC: 0.6 MG/DL (ref 0.2–1.3)
BUN SERPL-MCNC: 11 MG/DL (ref 7–20)
CALCIUM: 10 MG/DL (ref 8.4–10.2)
CHLORIDE SERPL-SCNC: 99 MMOL/L (ref 98–107)
CO2 SERPL-SCNC: 24 MMOL/L (ref 22–30)
EOSINOPHIL # BLD AUTO: 0.3 10^3/UL (ref 0–0.6)
EOSINOPHIL NFR BLD AUTO: 2.1 % (ref 0–6)
ERYTHROCYTE [DISTWIDTH] IN BLOOD BY AUTOMATED COUNT: 13.4 % (ref 11.5–14)
GLUCOSE SERPL-MCNC: 340 MG/DL (ref 75–110)
HCT VFR BLD CALC: 50.9 % (ref 36–47)
HGB BLD-MCNC: 17.4 G/DL (ref 12–15.5)
LIPASE SERPL-CCNC: 146.4 U/L (ref 23–300)
LYMPHOCYTES # BLD AUTO: 5.2 10^3/UL (ref 0.5–4.7)
LYMPHOCYTES NFR BLD AUTO: 43.3 % (ref 13–45)
MCH RBC QN AUTO: 29.3 PG (ref 27–33.4)
MCHC RBC AUTO-ENTMCNC: 34.3 G/DL (ref 32–36)
MCV RBC AUTO: 86 FL (ref 80–97)
MONOCYTES # BLD AUTO: 0.8 10^3/UL (ref 0.1–1.4)
MONOCYTES NFR BLD AUTO: 6.4 % (ref 3–13)
NEUTROPHILS # BLD AUTO: 5.7 10^3/UL (ref 1.7–8.2)
NEUTS SEG NFR BLD AUTO: 47.4 % (ref 42–78)
PLATELET # BLD: 322 10^3/UL (ref 150–450)
POTASSIUM SERPL-SCNC: 4 MMOL/L (ref 3.6–5)
PROT SERPL-MCNC: 7.5 G/DL (ref 6.3–8.2)
RBC # BLD AUTO: 5.94 10^6/UL (ref 3.72–5.28)
SODIUM SERPL-SCNC: 137.3 MMOL/L (ref 137–145)
TOTAL CELLS COUNTED % (AUTO): 100 %
WBC # BLD AUTO: 11.9 10^3/UL (ref 4–10.5)

## 2018-03-17 PROCEDURE — 84703 CHORIONIC GONADOTROPIN ASSAY: CPT

## 2018-03-17 PROCEDURE — 83036 HEMOGLOBIN GLYCOSYLATED A1C: CPT

## 2018-03-17 PROCEDURE — 85025 COMPLETE CBC W/AUTO DIFF WBC: CPT

## 2018-03-17 PROCEDURE — 76705 ECHO EXAM OF ABDOMEN: CPT

## 2018-03-17 PROCEDURE — 36415 COLL VENOUS BLD VENIPUNCTURE: CPT

## 2018-03-17 PROCEDURE — 96375 TX/PRO/DX INJ NEW DRUG ADDON: CPT

## 2018-03-17 PROCEDURE — 74019 RADEX ABDOMEN 2 VIEWS: CPT

## 2018-03-17 PROCEDURE — 82962 GLUCOSE BLOOD TEST: CPT

## 2018-03-17 PROCEDURE — 99284 EMERGENCY DEPT VISIT MOD MDM: CPT

## 2018-03-17 PROCEDURE — 83690 ASSAY OF LIPASE: CPT

## 2018-03-17 PROCEDURE — S0164 INJECTION PANTROPRAZOLE: HCPCS

## 2018-03-17 PROCEDURE — 96361 HYDRATE IV INFUSION ADD-ON: CPT

## 2018-03-17 PROCEDURE — 96374 THER/PROPH/DIAG INJ IV PUSH: CPT

## 2018-03-17 PROCEDURE — 80053 COMPREHEN METABOLIC PANEL: CPT

## 2018-03-17 NOTE — ER DOCUMENT REPORT
ED General





- General


Chief Complaint: Vomiting


Stated Complaint: STOMACH PAIN


Time Seen by Provider: 03/17/18 20:01


Notes: 





Patient is a 42-year-old female with past medical history of hypertension, non-

insulin-dependent type 2 diabetes who presents with 4 days of upper abdominal 

pain, nausea and vomiting.  She does describe the abdominal pain as a constant, 

stabbing, throbbing pain to her upper abdomen.  Patient states that she has had 

difficulty tolerating even water over the past 48 hours which prompted her come 

to the emergency room today due to concerns of dehydration.  She denies a 

history of similar symptoms in the past.  Nothing seems to improve or worsen 

her symptoms.  She has not seen a primary care doctor regarding today's 

concerns.  She denies any chest pain, shortness of breath, headache, neck pain, 

fever or altered mental status.  She states that she has been taking her 

medications as directed although she does note that her blood sugars continue 

to be significantly elevated at home.


TRAVEL OUTSIDE OF THE [place] IN LAST 30 DAYS: No





- Related Data


Allergies/Adverse Reactions: 


 





No Known Allergies Allergy (Verified 03/17/18 21:55)


 











Past Medical History





- General


Information source: Patient





- Social History


Smoking Status: Never Smoker


Frequency of alcohol use: None


Drug Abuse: None


Family History: Reviewed & Not Pertinent, DM





- Past Medical History


Cardiac Medical History: 


   Denies: Hx Congestive Heart Failure, Hx DVT, Hx Heart Attack, Hx 

Hypercholesterolemia, Hx Hypertension, Hx Pulmonary Embolism


Pulmonary Medical History: 


   Denies: Hx Asthma, Hx COPD, Hx Sleep Apnea


Neurological Medical History: Denies: Hx Seizures


Endocrine Medical History: Reports: Hx Diabetes Mellitus Type 2.  Denies: Hx 

Hyperthyroidism, Hx Hypothyroidism


Renal/ Medical History: Denies: Hx Peritoneal Dialysis


GI Medical History: Denies: Hx Cirrhosis, Hx Hepatitis


Psychiatric Medical History: 


   Denies: Hx Depression


Infectious Medical History: Denies: Hx C-Diff, Hx Hepatitis, Hx MRSA


Past Surgical History: Reports: Other - Excision of benign cervical polyp





- Immunizations


Hx Diphtheria, Pertussis, Tetanus Vaccination: Yes





Review of Systems





- Review of Systems


Notes: 





Constitutional: Negative for fever.


HENT: Negative for sore throat.


Eyes: Negative for visual changes.


Cardiovascular: Negative for chest pain.


Respiratory: Negative for shortness of breath.


Gastrointestinal: Positive for abdominal pain vomiting


Genitourinary: Negative for dysuria.


Musculoskeletal: Negative for back pain.


Skin: Negative for rash.


Neurological: Negative for headaches, weakness or numbness.





10 point ROS negative except as marked above and in HPI.





Physical Exam





- Vital signs


Vitals: 


 











Temp Pulse Resp BP Pulse Ox


 


 99.2 F   109 H  20   152/104 H  99 


 


 03/17/18 19:37  03/17/18 19:37  03/17/18 19:37  03/17/18 19:37  03/17/18 19:37











Interpretation: Hypertensive, Tachycardic


Notes: 





PHYSICAL EXAMINATION:





GENERAL: Appears moderately uncomfortable but no acute distress





HEAD: Atraumatic, normocephalic.





EYES: Pupils equal round and reactive to light, extraocular movements intact, 

sclera anicteric, conjunctiva are normal.





ENT: nares patent, oropharynx clear without exudates.  Mild dry mucous 

membranes.





NECK: Normal range of motion, supple without lymphadenopathy





LUNGS: Breath sounds clear to auscultation bilaterally and equal.  No wheezes 

rales or rhonchi.





HEART: Regular tachycardia without murmurs





ABDOMEN: Soft, mild epigastric and right upper quadrant abdominal tenderness 

but no other localized areas of tenderness, normoactive bowel sounds.  No 

guarding, no rebound.  No masses appreciated.





EXTREMITIES: Normal range of motion, no pitting or edema.  No cyanosis.





NEUROLOGICAL: No focal neurological deficits. Moves all extremities 

spontaneously and on command.





PSYCH: Normal mood, normal affect.





SKIN: Warm, Dry, normal turgor, no rashes or lesions noted.





Course





- Re-evaluation


Re-evalutation: 





03/17/18 20:35


Patient presents with epigastric abdominal pain with associated reflux symptoms 

most consistent with likely gastritis versus diabetic gastroparesis.  Patient 

has no focal abdominal tenderness on examination.  Mild epigastric abdominal 

tenderness.  Right upper quadrant ultrasound does not demonstrate any evidence 

of acute cholecystitis or cholelithiasis.  Lipase is normal.  No LFT changes.  

Based on history and exam, I do not suspect ACS, pulmonary embolus, SBO, 

mesenteric ischemia, acute pancreatitis, biliary pathology, or an abdominal 

aortic dissection.  2 view of the abdomen likewise does not support a diagnosis 

of an obstruction and patient has been able to tolerate oral intake.  I have 

emphasized with the patient the critical importance of long-term blood sugar 

management as she continues to be hyperglycemic and notes that her last 

hemoglobin A1c was 11.  


03/17/18 22:21


Hemoglobin A1c continues to be markedly elevated at 11.1.  The remainder of the 

patient's laboratories are otherwise unremarkable.  Right upper quadrant 

ultrasound does not demonstrate any evidence of acute biliary pathology 

although does show chronic hepatocellular disease.  I have instructed her to 

follow-up regarding this finding.  Two-view abdomen does not show any evidence 

of obstruction.  Patient has tolerated oral intake throughout her stay without 

any difficulty.  At this time she states her symptoms have completely resolved 

and she feels much better.  She will be discharged home with a prescription for 

Carafate prior to meals, famotidine, as well as Reglan.  I have heavily 

emphasized the patient that she needs to transition to insulin as she continues 

to have dangerously elevated blood sugars despite taking oral hypoglycemics as 

directed by her doctor.  At this time will discharge with return precautions 

and follow-up recommendations.  Verbal discharge instructions given a the 

bedside and opportunity for questions given. Medication warnings reviewed. 

Patient is in agreement with this plan and has verbalized understanding of 

return precautions and the need for primary care follow-up in the next 24-72 

hours.





- Vital Signs


Vital signs: 


 











Temp Pulse Resp BP Pulse Ox


 


 98.9 F   109 H  12   177/101 H  100 


 


 03/17/18 22:38  03/17/18 19:37  03/17/18 22:34  03/17/18 22:34  03/17/18 22:36














- Laboratory


Result Diagrams: 


 03/17/18 20:49





 03/17/18 20:49


Laboratory results interpreted by me: 


 











  03/17/18 03/17/18 03/17/18





  19:50 20:49 20:49


 


WBC   11.9 H 


 


RBC   5.94 H 


 


Hgb   17.4 H 


 


Hct   50.9 H 


 


Absolute Lymphocytes   5.2 H 


 


Glucose    340 H


 


POC Glucose  387 H  


 


Hemoglobin A1c %   














  03/17/18





  20:49


 


WBC 


 


RBC 


 


Hgb 


 


Hct 


 


Absolute Lymphocytes 


 


Glucose 


 


POC Glucose 


 


Hemoglobin A1c %  11.1 H














- Diagnostic Test


Radiology reviewed: Image reviewed, Reports reviewed


Radiology results interpreted by me: 





03/17/18 22:24


2 view abdomen: No evidence of obstruction or perforation





Discharge





- Discharge


Clinical Impression: 


 Epigastric abdominal pain





Nausea and vomiting


Qualifiers:


 Vomiting type: unspecified Vomiting Intractability: non-intractable Qualified 

Code(s): R11.2 - Nausea with vomiting, unspecified





Hyperglycemia due to type 2 diabetes mellitus


Qualifiers:


 Diabetes mellitus long term insulin use: without long term use Qualified Code(s

): E11.65 - Type 2 diabetes mellitus with hyperglycemia





Condition: Good


Disposition: HOME, SELF-CARE


Additional Instructions: 


Your symptoms appear to be most consistent with stomach or upper intestinal 

irritation.   Please begin taking famotidine 40 mg in the morning and 40 mg at 

night.  This medicine can be purchased directly over-the-counter.  You may also 

take medicine such as Pepto-Bismol or Tums to assist with your pain.  You may 

take the Reglan that has been prescribed as needed for nausea.  Please take 

Carafate before you eat.  Please return to emergency department immediately if 

you have worsening of your pain, shortness of breath, vomiting, become unable 

to exert yourself due to pain or difficulty breathing, you pass out, or have 

any pain that radiates into your arms, jaw, or back. Please also return if you 

have any additional symptoms that are concerning to you. As we have discussed, 

the most important thing is lifestyle changes.  You need to avoid smoking, sodas

, tea, coffee, alcohol, spicy foods, and acidic foods such as citrus fruits, 

tomato based products, berries, and most fruit juices. 





You need to followup urgently with your primary care doctor as your blood 

sugars were dangerously high today.  You have chronically elevated blood sugars 

and her hemoglobin A1c is again 11.1 today.  Please discuss with her primary 

care doctor about going onto insulin as it appears that oral hypoglycemic drugs 

are not sufficient to keep your blood sugars regulated.  You did not have any 

evidence of a dangerous condition associated with these blood sugars at this 

time.  Please take all of your medications exactly as directed.  You should 

avoid foods that are high in carbohydrates and sugary foods.  Please return to 

emergency department immediately if you develop weakness, persistent vomiting, 

confusion, or any other symptoms that are concerning to you.


Prescriptions: 


Metoclopramide HCl [Reglan 10 mg Tablet] 10 mg PO Q6HP PRN #30 tablet


 PRN Reason: 


Sucralfate [Carafate 1 gm Tablet] 1 gm PO ACHS #120 tablet


Referrals: 


TAHMINA MATHIAS MD [Primary Care Provider] - Follow up in 3-5 days

## 2018-03-17 NOTE — RADIOLOGY REPORT (SQ)
EXAM DESCRIPTION:  ABDOMEN 2 VIEWS



COMPLETED DATE/TIME:  3/17/2018 9:24 pm



REASON FOR STUDY:  vomiting, no bm



COMPARISON:  None.



NUMBER OF VIEWS:  Two views.



TECHNIQUE:  Supine and erect radiographic images of the abdomen acquired.



LIMITATIONS:  None.



FINDINGS:  FREE AIR: None. No abnormal gas collections.

LUNG BASES: Clear.

BOWEL GAS PATTERN: Nonobstructive pattern. No dilated loops or air fluid levels.  Moderate stool thro
ughout the colon

CALCIFICATIONS: No suspicious calcifications.

SOFT TISSUES: No gross mass or suggestion of organomegaly.

HARDWARE: None in the abdomen.

BONES: No acute fracture. No worrisome bone lesions.

OTHER: No other significant finding.



IMPRESSION:  NO RADIOGRAPHIC EVIDENCE FOR ACUTE ABDOMINAL DISEASE.



TECHNICAL DOCUMENTATION:  JOB ID:  [id number]

 2011 [employer]- All Rights Reserved



Reading location - IP/workstation name: [email or web address]

## 2018-03-17 NOTE — RADIOLOGY REPORT (SQ)
EXAM DESCRIPTION:  U/S ABDOMEN LIMITED W/O DOP



COMPLETED DATE/TIME:  3/17/2018 9:45 pm



REASON FOR STUDY:  upper abdominal pain, vomiting



COMPARISON:  None.



TECHNIQUE:  Dynamic and static grayscale images acquired of the abdomen and recorded on PACS. Additio
nal selected color Doppler and spectral images recorded.



LIMITATIONS:  None.



FINDINGS:  PANCREAS: Midline pancreas unremarkable

LIVER: Normal size liver with diffuse increased echogenicity from hepatocellular disease.  There is f
ocal sparing at the gallbladder fossa.

LIVER VASCULATURE: Normal directional flow of the main portal vein and hepatic veins.

GALLBLADDER: No stones.  Normal gallbladder wall thickness with mild adenomyosis.  No pericholecystic
 fluid

ULTRASOUND-DETECTED HAYNES'S SIGN: Negative.

INTRAHEPATIC DUCTS AND COMMON DUCT: CBD and intrahepatic ducts normal caliber. No filling defects.

INFERIOR VENA CAVA: Normal flow.

AORTA: No aneurysm.

RIGHT KIDNEY:  Normal size. Normal echogenicity. No solid or suspicious masses. No hydronephrosis. No
 calcifications.

PERITONEAL AND RIGHT PLEURAL SPACE: No ascites or effusions.

OTHER: No other significant findings.



IMPRESSION:  Echogenic liver from diffuse hepatocellular disease

No gallstones gallbladder wall thickening or pericholecystic fluid



TECHNICAL DOCUMENTATION:  JOB ID:  [id number]

 2011 [employer]- All Rights Reserved



Reading location - IP/workstation name: [email or web address]

## 2018-10-24 ENCOUNTER — HOSPITAL ENCOUNTER (OUTPATIENT)
Dept: HOSPITAL 62 - OD | Age: 43
End: 2018-10-24
Payer: COMMERCIAL

## 2018-10-24 DIAGNOSIS — M54.5: ICD-10-CM

## 2018-10-24 DIAGNOSIS — R29.898: ICD-10-CM

## 2018-10-24 DIAGNOSIS — R05: ICD-10-CM

## 2018-10-24 DIAGNOSIS — F17.200: ICD-10-CM

## 2018-10-24 DIAGNOSIS — E11.8: Primary | ICD-10-CM

## 2018-10-24 LAB
FERRITIN SERPL-MCNC: 241 NG/ML (ref 6.2–137)
IRON(TIBC): 51.9 UG/DL (ref 37–170)

## 2018-10-24 PROCEDURE — 83550 IRON BINDING TEST: CPT

## 2018-10-24 PROCEDURE — 36415 COLL VENOUS BLD VENIPUNCTURE: CPT

## 2018-10-24 PROCEDURE — 71046 X-RAY EXAM CHEST 2 VIEWS: CPT

## 2018-10-24 PROCEDURE — 83540 ASSAY OF IRON: CPT

## 2018-10-24 PROCEDURE — 72114 X-RAY EXAM L-S SPINE BENDING: CPT

## 2018-10-24 PROCEDURE — 82728 ASSAY OF FERRITIN: CPT

## 2018-10-24 NOTE — RADIOLOGY REPORT (SQ)
EXAM DESCRIPTION:  CHEST PA/LATERAL



COMPLETED DATE/TIME:  10/24/2018 10:20 am



REASON FOR STUDY:  COUGH



COMPARISON:  None.



EXAM PARAMETERS:  NUMBER OF VIEWS: two views

TECHNIQUE: Digital Frontal and Lateral radiographic views of the chest acquired.

RADIATION DOSE: NA

LIMITATIONS: none



FINDINGS:  LUNGS AND PLEURA: No opacities, masses or pneumothorax. No pleural effusion.

MEDIASTINUM AND HILAR STRUCTURES: No masses or contour abnormalities.

HEART AND VASCULAR STRUCTURES: Heart normal size.  No evidence for failure.

BONES: No acute findings.

HARDWARE: None in the chest.

OTHER: No other significant finding.



IMPRESSION:  NO SIGNIFICANT RADIOGRAPHIC FINDING IN THE CHEST.



TECHNICAL DOCUMENTATION:  JOB ID:  [id number]

 2011 [employer]- All Rights Reserved



Reading location - IP/workstation name: [email or web address]

## 2018-10-24 NOTE — RADIOLOGY REPORT (SQ)
EXAM DESCRIPTION:  LUMBAR SPINE W/FLEX/EXT



COMPLETED DATE/TIME:  10/24/2018 10:20 am



REASON FOR STUDY:  LBP E11.8  TYPE 2 DIABETES MELLITUS WITH UNSPECIFIED COMPLICATION R05  COUGH



COMPARISON:  Abdominal films 3/17/2018



NUMBER OF VIEWS:  Seven views.



TECHNIQUE:  AP, lateral, obliques, flexion, extension, and sacral radiographic images acquired.



LIMITATIONS:  None.



FINDINGS:  MINERALIZATION: Normal.

SEGMENTATION: Normal.  No transitional anatomy.

ALIGNMENT: Normal.

FLEXION/EXTENSION: No instability.

VERTEBRAE: Maintained height.  No fracture or worrisome bone lesion.

DISCS: Very mild disc space loss of height at L3-4, L4-5, and L5-S1 with mild anterior osteophyte for
mation.

POSTERIOR ELEMENTS: Pedicles and facets are intact.  No pars defect or posterior arch defects.

HARDWARE: None in the spine.

PARASPINAL SOFT TISSUES: Normal.

PELVIS: Not included in the field of view.  SI joints unremarkable

OTHER: No other significant finding.



IMPRESSION:  Very mild disc space loss of height at L3-4, L4-5, and L5-S1.

NO INSTABILITY ON FLEXION/EXTENSION.



TECHNICAL DOCUMENTATION:  JOB ID:  [id number]

 2011 [employer]- All Rights Reserved



Reading location - IP/workstation name: [email or web address]

## 2019-07-23 ENCOUNTER — HOSPITAL ENCOUNTER (EMERGENCY)
Dept: HOSPITAL 62 - ER | Age: 44
Discharge: HOME | End: 2019-07-23
Payer: SELF-PAY

## 2019-07-23 VITALS — DIASTOLIC BLOOD PRESSURE: 85 MMHG | SYSTOLIC BLOOD PRESSURE: 145 MMHG

## 2019-07-23 DIAGNOSIS — E11.9: ICD-10-CM

## 2019-07-23 DIAGNOSIS — F17.210: ICD-10-CM

## 2019-07-23 DIAGNOSIS — M51.36: ICD-10-CM
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
## 2019-07-23 DIAGNOSIS — N30.01: Primary | ICD-10-CM

## 2019-07-23 DIAGNOSIS — M47.9: ICD-10-CM

## 2019-07-23 LAB
APPEARANCE UR: (no result)
APTT PPP: YELLOW S
BILIRUB UR QL STRIP: NEGATIVE
GLUCOSE UR STRIP-MCNC: >=500 MG/DL
KETONES UR STRIP-MCNC: (no result) MG/DL
NITRITE UR QL STRIP: NEGATIVE
PH UR STRIP: 5 [PH] (ref 5–9)
PROT UR STRIP-MCNC: NEGATIVE MG/DL
SP GR UR STRIP: 1.03
UROBILINOGEN UR-MCNC: NEGATIVE MG/DL (ref ?–2)

## 2019-07-23 PROCEDURE — 81001 URINALYSIS AUTO W/SCOPE: CPT

## 2019-07-23 PROCEDURE — 99284 EMERGENCY DEPT VISIT MOD MDM: CPT

## 2019-07-23 PROCEDURE — 72110 X-RAY EXAM L-2 SPINE 4/>VWS: CPT

## 2019-07-23 PROCEDURE — 76770 US EXAM ABDO BACK WALL COMP: CPT

## 2019-07-23 PROCEDURE — 96374 THER/PROPH/DIAG INJ IV PUSH: CPT

## 2019-07-23 PROCEDURE — 81025 URINE PREGNANCY TEST: CPT

## 2019-07-23 NOTE — ER DOCUMENT REPORT
ED Neck/Back Problem





- General


Chief Complaint: Back Pain


Stated Complaint: FALL/BACK PAIN


Time Seen by Provider: 07/23/19 09:06


Primary Care Provider: 


[facility] [Primary Care Provider] - Follow up in 3-5 days


Mode of Arrival: Wheelchair


Information source: Patient


Notes: 





44-year-old female presented to ED for complaint of pain to the lower left back.

 She states she fell backwards off of the deck landing on her back.  She has a 

large tender swollen bruised area to the left lower back.  She states she does 

have diabetes no other medical history G she does smoke 1/2 pack a day does not 

drink or do any drugs.  Patient is alert oriented respirations regular and 

unlabored she has been able to walk but it is very painful.  She has not lost 

control of bowel or bladder and is not loss control of her lower extremities.


TRAVEL OUTSIDE OF THE [place] IN LAST 30 DAYS: No





- HPI


Patient complains to provider of: Pain, Injury, Lower back


Onset: Other - Sunday


Where: Home, Outdoors


Onset: Gradual


Timing: Still present


Quality of pain: Sharp


Severity: Moderate


Pain Level: 4


Context: Fall/near-fall


Recent injury: Yes


Associated symptoms: Radiation to leg, Lower back pain


Exacerbated by: Movement of trunk, Sitting position


Relieved by: Nothing


Similar symptoms previously: No


Recently seen / treated by doctor: No





- Related Data


Allergies/Adverse Reactions: 


                                        





No Known Allergies Allergy (Verified 07/23/19 09:01)


   











Past Medical History





- General


Information source: Patient





- Social History


Smoking Status: Current Every Day Smoker


Cigarette use (# per day): Yes - Half pack a day


Smoking Education Provided: Yes - Minutes


Frequency of alcohol use: None


Lives with: Family


Family History: Reviewed & Not Pertinent, DM


Patient has suicidal ideation: No


Patient has homicidal ideation: No





- Past Medical History


Cardiac Medical History: Reports: None


Pulmonary Medical History: Reports: None


EENT Medical History: Reports: None


Endocrine Medical History: Reports: Hx Diabetes Mellitus Type 2


Renal/ Medical History: Reports: None


Malignancy Medical History: Reports: None


GI Medical History: Reports: None


Musculoskeletal Medical History: Reports None


Skin Medical History: Reports None


Psychiatric Medical History: Reports: None


Traumatic Medical History: Reports: None


Infectious Medical History: Reports: None


Past Surgical History: Reports: Other - Excision of benign cervical polyp





- Immunizations


Hx Diphtheria, Pertussis, Tetanus Vaccination: Yes





Review of Systems





- Review of Systems


Constitutional: No symptoms reported


EENT: No symptoms reported


Cardiovascular: No symptoms reported


Respiratory: No symptoms reported


Gastrointestinal: No symptoms reported


Genitourinary: No symptoms reported


Female Genitourinary: No symptoms reported


Musculoskeletal: Back pain, Muscle pain, Muscle stiffness


Skin: Other


Hematologic/Lymphatic: No symptoms reported


Neurological/Psychological: No symptoms reported


-: Yes All other systems reviewed and negative





Physical Exam





- Vital signs


Vitals: 


                                        











Temp Pulse Resp BP Pulse Ox


 


 98.5 F   87   16   147/88 H  98 


 


 07/23/19 09:05  07/23/19 09:05  07/23/19 09:05  07/23/19 09:05  07/23/19 09:05











Interpretation: Normal





- General


General appearance: Appears well, Alert





- HEENT


Head: Normocephalic, Atraumatic


Eyes: Normal


Pupils: PERRL





- Respiratory


Respiratory status: No respiratory distress


Chest status: Nontender


Breath sounds: Normal


Chest palpation: Normal





- Cardiovascular


Rhythm: Regular


Heart sounds: Normal auscultation


Murmur: No





- Abdominal


Inspection: Normal


Distension: No distension


Bowel sounds: Normal


Tenderness: Nontender


Organomegaly: No organomegaly





- Back


Back: Normal, Tender - Large tender bruised swelling.  No: Vertebra tenderness





- Extremities


General upper extremity: Normal inspection, Nontender, Normal color, Normal ROM,

Normal temperature


General lower extremity: Normal inspection, Nontender, Normal color, Normal ROM,

Normal temperature, Normal weight bearing.  No: Merary's sign





- Neurological


Neuro grossly intact: Yes


Cognition: Normal


Orientation: AAOx4


[place] Coma Scale Eye Opening: Spontaneous


[place] Coma Scale Verbal: Oriented


[place] Coma Scale Motor: Obeys Commands


Theodore Coma Scale Total: 15


Speech: Normal


Motor strength normal: LUE, RUE, LLE, RLE


Sensory: Normal





- Psychological


Associated symptoms: Normal affect, Normal mood





- Skin


Skin Temperature: Warm


Skin Moisture: Dry


Skin Color: Normal





Course





- Re-evaluation


Re-evalutation: 





07/23/19 21:28


After performing a Medical Screening Examination, I estimate there is LOW risk 

for EXPANDING OR RUPTURED ABDOMINAL AORTIC ANEURYSM, CAUDA EQUINA SYNDROME, 

EPIDURAL MASS LESION, or HERNIATED DISK CAUSING SEVERE SPINAL STENOSIS, thus I 

consider the discharge disposition reasonable.  I have reevaluated this patient 

multiple times and no significant life threatening changes are noted. The 

patient  and I have discussed the diagnosis and risks, and we agree with 

discharging home and close follow-up. We also discussed returning to the 

Emergency Department immediately if new or worsening symptoms occur with the 

understanding that symptoms and presentations can change. We have discussed the 

symptoms which are most concerning (e.g., saddle anesthesia, urinary or bowel 

incontinence or retention, changing or worsening pain) that necessitate 

immediate return.





- Vital Signs


Vital signs: 


                                        











Temp Pulse Resp BP Pulse Ox


 


 98.2 F   60   18   145/85 H  100 


 


 07/23/19 12:16  07/23/19 12:16  07/23/19 12:16  07/23/19 12:16  07/23/19 12:16














- Laboratory


Laboratory results interpreted by me: 


                                        











  07/23/19





  09:50


 


Urine Glucose (UA)  >=500 H


 


Urine Ketones  TRACE H


 


Ur Leukocyte Esterase  MODERATE H














- Diagnostic Test


Radiology reviewed: Image reviewed, Reports reviewed





Discharge





- Discharge


Clinical Impression: 


 Degenerative lumbar disc, Arthritis, low back





UTI (urinary tract infection)


Qualifiers:


 Urinary tract infection type: acute cystitis Hematuria presence: with hematuria

Qualified Code(s): N30.01 - Acute cystitis with hematuria





Condition: Stable


Disposition: HOME, SELF-CARE


Additional Instructions: 


URINARY TRACT INFECTION:





     Your evaluation indicates that you have a urinary tract infection. This is 

due to germs growing in the bladder.  This is a common problem.


     This infection usually responds quickly to antibiotics.  Your antibiotic 

should be taken exactly as prescribed.  Drink plenty of fluids -- three to four 

quarts a day.


     Occasionally, a bladder anesthetic will be prescribed to help stop the 

feeling of urgency until the antibiotic has a chance to clear the infection.  

This may cause your urine to be dark orange.


     Certain urine infections require a culture.  If the doctor obtained a 

culture, the results will be back in two days.  You should call to see if a 

change in treatment is needed.


     A repeat urinalysis after you finish treatment is often recommended.  The 

physician will let you know if further testing is required.


     Call the doctor if you develop fever, chills, flank pain, inability to 

urinate, or blood in the urine.





VAGINAL YEAST INFECTION:





     You have evidence of a yeast infection -- called "candida."  A vaginal 

yeast infection often causes itching and discharge.  While not dangerous, it can

be very unpleasant.  A yeast infection often follows the use of powerful 

antibiotics.  It is more likely to occur in diabetics.


     The treatment now is usually a single pill of Diflucan, but also an 

antifungal cream or suppository may be used for a few days.  You do not need to 

avoid sexual intercourse.


     Recurrences are common.  You can make a recurrence less likely by wearing 

cotton underwear and avoiding tight clothing.  For mild recurrences, you can try

over-the-counter creams or suppositories that are made specifically for yeast.


     If the symptoms do not resolve, you should follow up for re-examination.  

Sometimes treatment of the sexual partner is necessary if infections are 

recurrent.





Arthritis





     Your symptoms are due to arthritis.  Arthritis is an inflammation of the 

joints.  There are many types -- osteoarthritis (due to "wear and tear"), auto-

immmune arthritis (such as rheumatoid, lupus, Tasha's, and others), and tom

l-induced arthritis (such as gout and pseudogout).  The physician's examination,

combined with laboratory tests, will determine the cause of your arthritis.


     All types of arthritis are treated with antiinflammatory medications.  

Other medication may be required for special types of arthritis, or if your 

problem does not respond to the antiinflammatory medicine.


     Local warmth may be helpful.  Move the involved joints through the full 

range of motion daily.  Mild exercise is usually still possible for most persons

with arthritis (ask your physician).  Swimming provides good exercise without 

damaging the joints.


     Contact the physician if you are worsening in any way.





LOW BACK PAIN:





     Three out of every four people will have an episode of disabling back pain 

during their lifetime.  Most commonly the pain is due to straining of the 

muscles and ligaments in the low back.


     Usual treatment includes: 


(1) Rest on a firm surface.  Avoid lying on your stomach.  


(2) Ice pack the painful area.  After a few days, gentle heat may be used 

intermittently to relax the area, or ice packs can be continued.  


(3) Medication may be needed -- muscle relaxers and antiinflammatory medicines 

are commonly used.  


(4) As the back improves, exercises are prescribed to strengthen the back and 

abdominal muscles.


     Your doctor will advise you on the proper care for your back at each stage 

in your recovery.  You may be better in a few days -- or healing may take 

several weeks.


     If new symptoms of a "herniated disc" (radiation of pain, numbness, or 

tingling down the back of the leg or weakness in the leg) occur, you should be 

re-examined.  Further testing may be necessary.











ICE PACKS:


     Apply ice packs frequently against the painful area.  Many different sche

dules are recommended, such as "20 minutes on, 20 minutes off" or "one hour ice,

two hours rest."  If you need to work, you may need to go longer between ice 

treatments.  You should plan to have the area ice packed AT LEAST one fourth of 

the time.


     The ice should be applied over the wrap, tape, or splint, or over a layer 

of cloth -- not directly against the skin.  Some ice bags have a built-in cloth 

and can be put directly on the skin.





WARM PACKS:


     After approximately two days, apply gentle heat (such as a heating pad or 

hot water bottle) for about 20 to 30 minutes about every two hours -- at least 

four times daily.  Warmth and elevation will help you make a more rapid 

recovery, and will ease the pain considerably.


     Do not use HOT heat, and never apply heat for longer than 30 minutes.  The 

continuous heat can invisibly damage skin and muscles -- even when no burn is 

seen on the surface.  Damaged muscles can make you MORE sore.














NITROFURANTOIN (MACRODANTIN, MACROBID):


     You have received a prescription for nitrofurantoin (Macrodantin). This 

antibiotic is used for urinary tract infections.





Women who are pregnant or nursing should notify the physician before taking this

medicine.  If you have ever had a problem caused by this medication in the past,

be sure the physician is aware of it.


     Common side effects of this medicine include nausea, vomiting, or decreased

appetite.  Notify your physician if these side effects become severe.


     Immediately stop this medicine and call the physician if you develop cough,

shortness of breath, chest pain, weakness, jaundice (yellow color of the skin 

and whites 





FLUCONAZOLE:


     Fluconazole (Diflucan) is an antifungal drug.  It is useful for serious 

fungal infections, but is also excellent for oral or vaginal yeast infections.


     Diflucan interacts with some medicines.  This is a concern if you are 

taking anticoagulants (such as Coumadin), phenytoin (Dilantin), cyclosporin, or 

oral hypoglycemics (such as tolbutamide, Orinase, glipizide, Glucotrol, 

glyburide, DiaBeta, Glynase, and Micronase).  Be sure the doctor knows if you 

are taking one of these medicines.


     We don't know how Diflucan affects pregnancy.  If you are planning to 

become pregnant, discuss this with your doctor.


     Diflucan has few side effects.  Minor side effects may include nausea, 

headache, or diarrhea.  Call the doctor if you develop a skin rash, shortness of

breath, or other new symptoms.





Toradol Injection





     You have been given an injection of ketorolac tromethamine (Toradol).  This

is an excellent, safe drug for pain control.  It also has potent 

antiinflammatory action.  You should have significant pain relief within about 

one hour.


     Toradol is not addicting and is non-sedating.  It does not interfere with 

driving or work.


     Call or return if you develop itching, hives, shortness of breath, or rash.





Stretching Exercises for the Back





     The physician has recommended that you begin stretching exercises for your 

back.  These are often used even while the back is painful. However, you should 

notify the physician if the activities seem to increase your pain.


     PELVIC TILT:  Lie flat on your back with knees bent.  Tighten your stomach 

and buttock muscles so it flattens your lower back against the floor.  Hold 10 

seconds.  Repeat 10 times, twice daily.


     KNEE RAISE:  Lying on the back with knees bent, raise one knee to your 

chest, then the other.  Hold both knees against the chest 10 seconds, then lower

one knee at a time.  Repeat 10 times, twice daily.


     PARTIAL TRUNK RAISE:  Lie face down, arms at your sides.  Keeping your 

waist on the floor, use your arms raise your chest up.  Support yourself on your

elbows for 30 seconds.  Repeat twice daily, increasing the time to two minutes 

as you recover.











FOLLOW-UP CARE:


If you have been referred to a physician for follow-up care, call the 

physicians office for an appointment as you were instructed or within the next 

two days.  If you experience worsening or a significant change in your symptoms,

notify the physician immediately or return to the Emergency Department at any 

time for re-evaluation.








Prescriptions: 


Nitrofurantoin/Nitrofuran Mac [Macrobid 100 mg Capsule] 1 tab PO BID #20 capsule


Forms:  Elevated Blood Pressure, Smoking Cessation Education, Return to Work


Referrals: 


[facility] [Primary Care Provider] - Follow up in 3-5 days

## 2019-07-23 NOTE — RADIOLOGY REPORT (SQ)
EXAM DESCRIPTION:  L SPINE WHOLE



COMPLETED DATE/TIME:  7/23/2019 9:35 am



REASON FOR STUDY:  Fall off porch landed on left lower back



COMPARISON:  10/24/2018



NUMBER OF VIEWS:  Five views including obliques.



TECHNIQUE:  AP, lateral, oblique, and sacral radiographic images acquired of the lumbar spine.



LIMITATIONS:  None.



FINDINGS:  MINERALIZATION: Normal.

SEGMENTATION: Normal.  No transitional anatomy.

ALIGNMENT: There is loss of the normal lumbar lordosis.

VERTEBRAE: Maintained height.  No fracture or worrisome bone lesion.

DISCS: Mild disc space narrowing at L2-L3, L3-L4 and L4-L5.  Small anterior osteophytes at the same l
evels.

POSTERIOR ELEMENTS: Pedicles and facets are intact.  No pars defect or posterior arch defects.

HARDWARE: None in the spine.

PARASPINAL SOFT TISSUES: Normal.

PELVIS: Intact as visualized. No fractures or worrisome bone lesions. SI joints intact.

OTHER: No other significant finding.



IMPRESSION:  Mild disc space narrowing as described.  No acute findings.



TECHNICAL DOCUMENTATION:  JOB ID:  [id number]

 2011 [employer]- All Rights Reserved



Reading location - IP/workstation name: [email or web address]

## 2019-07-23 NOTE — RADIOLOGY REPORT (SQ)
EXAM DESCRIPTION:  U/S RETROPERITON (RENAL/AORTA)



COMPLETED DATE/TIME:  7/23/2019 10:45 am



REASON FOR STUDY:  Fall off porch landed on left lower back



COMPARISON:  None.



TECHNIQUE:  Dynamic and static grayscale images acquired of the kidneys and bladder and recorded on P
ACS. Additional selected color Doppler and spectral images recorded.



LIMITATIONS:  None.



FINDINGS:  RIGHT KIDNEY:  Normal size.   Normal echogenicity.   No solid or suspicious masses.   No h
ydronephrosis.   No calcifications.

LEFT KIDNEY:  Normal size.   Normal echogenicity.   No solid or suspicious masses.   No hydronephrosi
s.   No calcifications.

BLADDER: No masses.

OTHER FINDINGS: No other significant finding.



IMPRESSION:  NORMAL RENAL AND BLADDER ULTRASOUND.



TECHNICAL DOCUMENTATION:  JOB ID:  [id number]

 2011 [employer]- All Rights Reserved



Reading location - IP/workstation name: [email or web address]